# Patient Record
Sex: MALE | Race: WHITE
[De-identification: names, ages, dates, MRNs, and addresses within clinical notes are randomized per-mention and may not be internally consistent; named-entity substitution may affect disease eponyms.]

---

## 2017-01-24 ENCOUNTER — HOSPITAL ENCOUNTER (OUTPATIENT)
Dept: HOSPITAL 45 - C.EDB | Age: 57
Setting detail: OBSERVATION
LOS: 1 days | Discharge: HOME | End: 2017-01-25
Attending: SURGERY | Admitting: SURGERY
Payer: COMMERCIAL

## 2017-01-24 VITALS
HEIGHT: 66 IN | WEIGHT: 216.27 LBS | BODY MASS INDEX: 34.76 KG/M2 | BODY MASS INDEX: 34.76 KG/M2 | HEIGHT: 66 IN | WEIGHT: 216.27 LBS

## 2017-01-24 DIAGNOSIS — K35.80: Primary | ICD-10-CM

## 2017-01-24 DIAGNOSIS — Z82.49: ICD-10-CM

## 2017-01-24 DIAGNOSIS — Z98.84: ICD-10-CM

## 2017-01-24 DIAGNOSIS — Z79.899: ICD-10-CM

## 2017-01-24 DIAGNOSIS — I10: ICD-10-CM

## 2017-01-24 LAB
ALP SERPL-CCNC: 71 U/L (ref 45–117)
ALT SERPL-CCNC: 18 U/L (ref 12–78)
ANION GAP SERPL CALC-SCNC: 13 MMOL/L (ref 3–11)
ANION GAP SERPL CALC-SCNC: 18 MMOL/L (ref 16–25)
AST SERPL-CCNC: 14 U/L (ref 15–37)
BASOPHILS # BLD: 0.02 K/UL (ref 0–0.2)
BASOPHILS NFR BLD: 0.2 %
BUN SERPL-MCNC: 14 MG/DL (ref 7–18)
BUN/CREAT SERPL: 17.7 (ref 10–20)
CA-I BLD-SCNC: 1.15 MMOL/L (ref 1.12–1.32)
CALCIUM SERPL-MCNC: 9.2 MG/DL (ref 8.5–10.1)
CHLORIDE BLD-SCNC: 106 MEQ/L (ref 101–112)
CHLORIDE SERPL-SCNC: 107 MMOL/L (ref 98–107)
CO2 SERPL-SCNC: 23 MMOL/L (ref 21–32)
COMPLETE: YES
CREAT BLD-MCNC: 0.7 MG/DL (ref 0.6–1.3)
CREAT CL PREDICTED SERPL C-G-VRATE: 111.7 ML/MIN
CREAT SERPL-MCNC: 0.79 MG/DL (ref 0.6–1.4)
EOSINOPHIL NFR BLD AUTO: 160 K/UL (ref 130–400)
GLUCOSE SERPL-MCNC: 94 MG/DL (ref 70–99)
HCT VFR BLD AUTO: 45 % (ref 42–52)
HCT VFR BLD CALC: 42.5 % (ref 42–52)
HGB BLD-MCNC: 15.3 G/DL (ref 14–18)
IG%: 0.3 %
IMM GRANULOCYTES NFR BLD AUTO: 13 %
ISTAT CARBON DIOXIDE: 23 MEQ/L (ref 24–31)
LYMPHOCYTES # BLD: 1.36 K/UL (ref 1.2–3.4)
MCH RBC QN AUTO: 28.2 PG (ref 25–34)
MCHC RBC AUTO-ENTMCNC: 33.9 G/DL (ref 32–36)
MCV RBC AUTO: 83.3 FL (ref 80–100)
MONOCYTES NFR BLD: 10.4 %
NEUTROPHILS # BLD AUTO: 0.3 %
NEUTROPHILS NFR BLD AUTO: 75.8 %
PMV BLD AUTO: 11.3 FL (ref 7.4–10.4)
POTASSIUM SERPL-SCNC: 3.8 MMOL/L (ref 3.5–5.1)
RBC # BLD AUTO: 5.1 M/UL (ref 4.7–6.1)
SODIUM BLD-SCNC: 142 MEQ/L (ref 135–144)
SODIUM SERPL-SCNC: 143 MMOL/L (ref 136–145)
WBC # BLD AUTO: 10.44 K/UL (ref 4.8–10.8)

## 2017-01-24 NOTE — DISCHARGE INSTRUCTIONS
Discharge Instructions


Admission


Reason for Admission:  Pain In Rlq





Discharge


Discharge Diagnosis / Problem:  acute appendicitis





Discharge Goals


Goal(s):  Decrease discomfort, Improve function





Activity Recommendations


Activity Limitations:  as noted below


Lifting Limitations:  no more than 10 pounds


Exercise/Sports Limitations:  until after follow-up appointment


May Resume Sexual Activity:  after follow-up appointment


Shower/Bathe:  tomorrow





.





Instructions / Follow-Up


Instructions / Follow-Up


call [phone] for follow up appt with Dr. Romo





Current Hospital Diet


Patient's current hospital diet:





Discharge Diet


Recommended Diet:  Regular Diet





Procedures


Procedures Performed:  


lap appy





Pending Studies


Studies pending at discharge:  yes (pathology report)


List of pending studies:  


path report





Medical Emergencies








.


Who to Call and When:





Medical Emergencies:  If at any time you feel your situation is an emergency, 

please call 911 immediately.





.





Non-Emergent Contact


Non-Emergency issues call your:  Primary Care Provider, Surgeon


Call Non-Emergent contact if:  temperature is above 101, your pain is not 

controlled, wound has increased drainage, wound has increased redness


.





"Provider Documentation" section prepared by Cornelio Romo.





VTE Core Measure


Inpt VTE Proph given/why not?:  Enoxaparin (Lovenox)SQ, SCD's

## 2017-01-24 NOTE — EMERGENCY ROOM VISIT NOTE
History


Report prepared by Abelino:  Cornelio Roblero


Under the Supervision of:  Dr. Jerson Payton M.D.


First contact with patient:  19:38


Chief Complaint:  ABDOMINAL PAIN


Stated Complaint:  PAIN IN RLQ





History of Present Illness


The patient is a 56 year old male who presents to the Emergency Room with 

complaints of persistent right lower quadrant abdominal pain that started mid-

day yesterday. He says that he had a bowel movement at a truck stop, and then 

started driving again. A bit later, he started having the abdominal pain. The 

patient felt nauseated last night and from time to time, he has been feeling 

dizzy. He notes that breathing does exacerbate the pain. The patient denies any 

vomiting, diarrhea, fevers, shortness of breath, chest pain, urinary symptoms, 

or hematochezia. The patient did take Tylenol yesterday but it did not help. He 

has a history of a gastric bypass. The patient was here in August for a low 

iron count and was admitted.





   Source of History:  patient


   Onset:  Mid-day yesterday


   Position:  abdomen (right-sided)


   Timing:  other (persistent)


   Modifying Factors (Worsening):  breathing


   Associated Symptoms:  + nausea, No SOB, No chest pain, No diarrhea, No fevers

, No hematochezia, No urinary symptoms, No vomiting


Note:


Associated symptoms: Intermittent dizziness.





Review of Systems


See HPI for pertinent positives & negatives. A total of 10 systems reviewed and 

were otherwise negative.





Past Medical & Surgical


Medical Problems:


(1) Abdominal pain


(2) Abdominal pain


(3) Appendicitis


(4) Dehydration


(5) HTN (hypertension)


(6) Perforated ulcer


Surgical Problems:


(1) H/O gastric bypass


(2) S/P laparoscopic surgery








Family History





Heart disease


Hypertension





Social History


Smoking Status:  Never Smoker


Alcohol Use:  occasionally


Marital Status:  


Occupation Status:  employed





Current/Historical Medications


Scheduled


Atenolol (Atenolol), 12.5 MG PO DAILY


Cholecalciferol (Vitamin D), 4,000 INTER.UNIT PO WK


Ferrous Gluconate (Ferrous Gluconate), 324 MG PO DAILY


Multivitamin (Multivitamin), 1 TAB PO DAILY


Pantoprazole (Pantoprazole Sodium), 40 MG PO DAILY





Scheduled PRN


Hydrocodone/Acetaminophen 5MG/325MG (Norco 5MG/325MG), 1-2 TABLET PO Q6 PRN for 

Pain





Allergies


Coded Allergies:  


     No Known Allergies (Unverified , 1/11/15)





Physical Exam


Vital Signs











  Date Time  Temp Pulse Resp B/P Pulse Ox O2 Delivery O2 Flow Rate FiO2


 


1/24/17 20:56  88 18 148/76 96 Room Air  


 


1/24/17 19:37 37.1 92 18 153/82 95 Room Air  











Physical Exam


GENERAL: Patient is uncomfortable-appearing and in mild distress.


HEENT: No acute trauma, normocephalic atraumatic, mucous membranes moist, no 

nasal congestion, no scleral icterus.


NECK: No stridor, no adenopathy, no meningismus, trachea is midline.


LUNGS: No dyspnea. Clear to auscultation and equal bilaterally. No wheeze, no 

rhonchi.


HEART: Regular rate and rhythm.  No murmurs, rubs, gallops appreciated.


ABDOMEN: Soft, right lower quadrant tenderness to palpation, bowel sounds 

positive, no masses appreciated, no peritonitis.


BACK: No midline tenderness, no CVA tenderness


EXTREMITIES: Normal motion all extremities, no cyanosis, no edema.


NEUROLOGIC: Alert and oriented, no acute motor or sensory deficits, no focal 

weakness, cranial nerves grossly intact.


SKIN: No rash, no jaundice, no diaphoresis.





Medical Decision & Procedures


ER Provider


Diagnostic Interpretation:


CT results are stated below per my interpretation and the radiologist's 

interpretation.  








CT OF THE ABDOMEN AND PELVIS WITH CONTRAST





CLINICAL HISTORY: Right lower quadrant pain.    





COMPARISON STUDY:  CT of the abdomen and pelvis January 11, 2015 and renal


ultrasound May 11, 2015.





TECHNIQUE: Following IV administration of 92 mL of Optiray-320, axial images of


the abdomen and pelvis were obtained from the lung bases to the proximal femurs.


Images were reviewed in the axial, sagittal, and coronal planes. IV contrast was


administered without complication.





CT DOSE: 628.56 mGy.cm





FINDINGS: A small hiatal hernia is present. Note is made of findings consistent


with gastric bypass. There is no evidence for a bowel obstruction. The


gallbladder us surgically absent. The liver, spleen, adrenal glands and left


kidney are normal. 2 right renal cysts are again noted. There is no free air,


pneumatosis or portal venous gas. The appendix is mildly dilated, measuring 1 cm


in caliber. There is mild periappendiceal infiltration. There is no free air or


abscess. The wall of the appendix is mildly thickened. No lymphadenopathy is


present. There is extensive sigmoid diverticulosis without evidence for acute


diverticulitis. The bladder is underdistended. Bladder wall thickening is again


noted. There is a small fat-containing left inguinal hernia. Mild dilatation of


the proximal celiac axis is unchanged. No suspicious skeletal lesions are


identified.











IMPRESSION:  





1. Findings consistent with acute appendicitis. No free air or abscess.





2. Nonspecific bladder wall thickening which is accentuated by underdistention.





Electronically signed by:  Joseph Rubin M.D.


1/24/2017 8:36 PM





Dictated Date/Time:  1/24/2017 8:30 PM





Laboratory Results


1/24/17 20:00








Red Blood Count 5.10, Mean Corpuscular Volume 83.3, Mean Corpuscular Hemoglobin 

28.2, Mean Corpuscular Hemoglobin Concent 33.9, Mean Platelet Volume 11.3, 

Neutrophils (%) (Auto) 75.8, Lymphocytes (%) (Auto) 13.0, Monocytes (%) (Auto) 

10.4, Eosinophils (%) (Auto) 0.3, Basophils (%) (Auto) 0.2, Neutrophils # (Auto

) 7.91, Lymphocytes # (Auto) 1.36, Monocytes # (Auto) 1.09, Eosinophils # (Auto

) 0.03, Basophils # (Auto) 0.02





1/24/17 20:00

















Test


  1/24/17


20:00 1/24/17


20:12


 


White Blood Count


  10.44 K/uL


(4.8-10.8) 


 


 


Red Blood Count


  5.10 M/uL


(4.7-6.1) 


 


 


Hemoglobin


  14.4 g/dL


(14.0-18.0) 


 


 


Hematocrit 42.5 % (42-52)  


 


Mean Corpuscular Volume


  83.3 fL


() 


 


 


Mean Corpuscular Hemoglobin


  28.2 pg


(25-34) 


 


 


Mean Corpuscular Hemoglobin


Concent 33.9 g/dl


(32-36) 


 


 


Platelet Count


  160 K/uL


(130-400) 


 


 


Mean Platelet Volume


  11.3 fL


(7.4-10.4) 


 


 


Neutrophils (%) (Auto) 75.8 %  


 


Lymphocytes (%) (Auto) 13.0 %  


 


Monocytes (%) (Auto) 10.4 %  


 


Eosinophils (%) (Auto) 0.3 %  


 


Basophils (%) (Auto) 0.2 %  


 


Neutrophils # (Auto)


  7.91 K/uL


(1.4-6.5) 


 


 


Lymphocytes # (Auto)


  1.36 K/uL


(1.2-3.4) 


 


 


Monocytes # (Auto)


  1.09 K/uL


(0.11-0.59) 


 


 


Eosinophils # (Auto)


  0.03 K/uL


(0-0.5) 


 


 


Basophils # (Auto)


  0.02 K/uL


(0-0.2) 


 


 


RDW Standard Deviation


  50.5 fL


(36.4-46.3) 


 


 


RDW Coefficient of Variation


  16.6 %


(11.5-14.5) 


 


 


Immature Granulocyte % (Auto) 0.3 %  


 


Immature Granulocyte # (Auto)


  0.03 K/uL


(0.00-0.02) 


 


 


Est Creatinine Clear Calc


Drug Dose 111.7 ml/min 


  


 


 


Estimated GFR (


American) 114.7 


  


 


 


Estimated GFR (Non-


American 99.0 


  


 


 


BUN/Creatinine Ratio 17.7 (10-20)  


 


Calcium Level


  9.2 mg/dl


(8.5-10.1) 


 


 


Total Bilirubin


  0.6 mg/dl


(0.2-1) 


 


 


Direct Bilirubin


  0.2 mg/dl


(0-0.2) 


 


 


Aspartate Amino Transf


(AST/SGOT) 14 U/L (15-37) 


  


 


 


Alanine Aminotransferase


(ALT/SGPT) 18 U/L (12-78) 


  


 


 


Alkaline Phosphatase


  71 U/L


() 


 


 


Total Protein


  7.2 gm/dl


(6.4-8.2) 


 


 


Albumin


  3.9 gm/dl


(3.4-5.0) 


 


 


Lipase


  107 U/L


() 


 


 


Bedside Hemoglobin


  


  15.3 g/dl


(14.0-18.0)


 


Bedside Hematocrit  45 % (42-52) 


 


Bedside Sodium


  


  142 mEq/L


(135-144)


 


Bedside Potassium


  


  3.8 mEq/L


(3.3-5.0)


 


Bedside Chloride


  


  106 mEq/L


(101-112)


 


Bedside Total CO2


  


  23 mEq/l


(24-31)


 


Anion Gap


  


  18.0 mmol/L


(16-25)


 


Bedside Blood Urea Nitrogen


  


  14 mg/dl


(7-18)


 


Bedside Creatinine


  


  0.7 mg/dl


(0.6-1.3)


 


Bedside Glucose (other)


  


  97 mg/dl


(70-99)


 


Bedside Ionized Calcium (Martínez)


  


  1.15 mmol/l


(1.12-1.32)





Laboratory results as reviewed by me.





Medications Administered











 Medications


  (Trade)  Dose


 Ordered  Sig/Nisa


 Route  Start Time


 Stop Time Status Last Admin


Dose Admin


 


 Sodium Chloride


  (Nss 1000ml)  1,000 ml @ 


 999 mls/hr  Q1H1M STAT


 IV  1/24/17 19:50


 1/24/17 20:50 DC 1/24/17 20:54


999 MLS/HR


 


 Cefoxitin Sodium


  (Mefoxin IV)  2,000 mg  NOW  STAT


 IV  1/24/17 20:43


 1/24/17 20:44 DC 1/24/17 20:55


2,000 MG


 


 Hydromorphone HCl


  (Dilaudid Inj)  1 mg  NOW  STAT


 IV  1/24/17 20:49


 1/24/17 20:51 DC 1/24/17 20:54


1 MG


 


 Ondansetron HCl


  (Zofran Inj)  4 mg  NOW  STAT


 IV  1/24/17 20:49


 1/24/17 20:51 DC 1/24/17 20:54


4 MG











ED Course


1940: The patient was evaluated in room C4. A complete history and physical 

exam was performed.





1950: Ordered NSS 1000 ml @ 999 mls/hr IV.





2043: Ordered Mefoxin IV 2000 mg IV.





2049: Ordered Zofran Inj 4 mg IV, Dilaudid Inj 1 mg IV.





2045: I discussed the patient with Dr. Demetrius WILKINSON General Surgery - he 

will evaluate the patient for further treatment.





2046: I reevaluated the patient and he would like something for pain.





Medical Decision


Differential:  Appendicitis, , MSK, Diverticulitis, UTI, Renal Colic, Bowel 

Obstruction, Aortic Pathology, amongst other pathologies entertained.





56 yr old male with RLQ TTP and pain over last 24-48 hours.  Labs look good but 

with exam felt CT required which revealed acute appendicitis. He is stable, not 

septic and doing well with some Dilaudid.  Given Mefoxin and gen surg in to see 

pt and took him to OR.





Consults


Time Called:  2043


Consulting Physician:  Dr. Demetrius WILKINSON General Surgery


Returned Call:  2045


 I discussed the patient with Dr. Demetrius WILKINSON General Surgery - he will 

evaluate the patient for further treatment.





Impression





 Primary Impression:  


 Acute appendicitis





Scribe Attestation


The scribe's documentation has been prepared under my direction and personally 

reviewed by me in its entirety. I confirm that the note above accurately 

reflects all work, treatment, procedures, and medical decision making performed 

by me.





Departure Information


Dispostion


Being Evaluated By Hospitalist





Prescriptions





Hydrocodone/Acetaminophen 5MG/325MG (Norco 5MG/325MG)  Tab


1-2 TABLET PO Q6 Y for Pain, #30 TAB


   Prov: Cornelio Romo D.O.         1/24/17





Referrals


Eric Andrade PA-C (PCP)





Patient Instructions


My [facility]





Problem Qualifiers








 Primary Impression:  


 Acute appendicitis


 Acute appendicitis type:  with localized peritonitis  Qualified Codes:  K35.3 

- Acute appendicitis with localized peritonitis

## 2017-01-24 NOTE — HISTORY AND PHYSICAL
History & Physical


Date


Jan 24, 2017.





Chief Complaint


abdominal pain





History of Present Illness


The patient is a 56 year old male with complaints of mid abdominal pain 

starting yesterday...worsening and pain now more RLQ.





Past Medical/Surgical History


Medical Problems:


(1) Abdominal pain


(2) Abdominal pain


(3) Appendicitis


(4) Dehydration


(5) HTN (hypertension)


(6) Perforated ulcer


Surgical Problems:


(1) H/O gastric bypass


(2) S/P laparoscopic surgery








Additional History


Hepatic Disease:  No


Endocrine Disorder:  No


Kidney Disease:  No


Hypertension:  Yes


Heart Disease:  No


Bleeding Tendencies:  No


Infectious Diseases:  No





Allergies


Coded Allergies:  


     No Known Allergies (Unverified , 1/11/15)





Home Medications


Scheduled


Atenolol (Atenolol), 12.5 MG PO DAILY


Cholecalciferol (Vitamin D), 4,000 INTER.UNIT PO WK


Ferrous Gluconate (Ferrous Gluconate), 324 MG PO DAILY


Multivitamin (Multivitamin), 1 TAB PO DAILY


Pantoprazole (Pantoprazole Sodium), 40 MG PO DAILY





Physical Examination


Skin:  warm/dry


Eyes:  normal inspection, EOMI


Head:  normocephalic


Neck:  supple


Respiratory/Chest:  lungs clear


Cardiovascular:  regular rate, rhythm, no edema


Abdomen / GI:  + pertinent finding (diffuse ttp.  tender RLQ.  mild guarding.)


Extremities:  normal inspection





Diagnosis


acute appendicitis





Plan of Treatment


discussed options/risks of surgery


bleeding/infection/abcess/dvt/pe/mi/injury to another organ/leakage etc...


questions answered


lap/possible open appendectomy tonight.

## 2017-01-24 NOTE — DIAGNOSTIC IMAGING REPORT
CT OF THE ABDOMEN AND PELVIS WITH CONTRAST



CLINICAL HISTORY: Right lower quadrant pain.    



COMPARISON STUDY:  CT of the abdomen and pelvis January 11, 2015 and renal

ultrasound May 11, 2015.



TECHNIQUE: Following IV administration of 92 mL of Optiray-320, axial images of

the abdomen and pelvis were obtained from the lung bases to the proximal femurs.

Images were reviewed in the axial, sagittal, and coronal planes. IV contrast was

administered without complication.



CT DOSE: 628.56 mGy.cm



FINDINGS: A small hiatal hernia is present. Note is made of findings consistent

with gastric bypass. There is no evidence for a bowel obstruction. The

gallbladder us surgically absent. The liver, spleen, adrenal glands and left

kidney are normal. 2 right renal cysts are again noted. There is no free air,

pneumatosis or portal venous gas. The appendix is mildly dilated, measuring 1 cm

in caliber. There is mild periappendiceal infiltration. There is no free air or

abscess. The wall of the appendix is mildly thickened. No lymphadenopathy is

present. There is extensive sigmoid diverticulosis without evidence for acute

diverticulitis. The bladder is underdistended. Bladder wall thickening is again

noted. There is a small fat-containing left inguinal hernia. Mild dilatation of

the proximal celiac axis is unchanged. No suspicious skeletal lesions are

identified.







IMPRESSION:  



1. Findings consistent with acute appendicitis. No free air or abscess.



2. Nonspecific bladder wall thickening which is accentuated by underdistention.







Electronically signed by:  Joseph Rubin M.D.

1/24/2017 8:36 PM



Dictated Date/Time:  1/24/2017 8:30 PM

## 2017-01-24 NOTE — [facility] OPERATIVE REPORT
Operative Report


Operative Date


Jan 24, 2017.





Pre-Operative Diagnosis





acute appendicitis





Post-Operative Diagnosis


acute appendicitis





Procedure(s) Performed


lap appy





Surgeon


leo





Findings


acutely inflammed appendix





Anesthesia


get





Complication(s)


None





Disposition


Recovery Room / PACU


I attest to the content of the Intraoperative Record and any orders documented 

therein.  Any exceptions are noted below.

## 2017-01-25 VITALS
DIASTOLIC BLOOD PRESSURE: 78 MMHG | SYSTOLIC BLOOD PRESSURE: 122 MMHG | OXYGEN SATURATION: 94 % | HEART RATE: 88 BPM | TEMPERATURE: 98.6 F

## 2017-01-25 VITALS
HEART RATE: 102 BPM | DIASTOLIC BLOOD PRESSURE: 90 MMHG | SYSTOLIC BLOOD PRESSURE: 146 MMHG | OXYGEN SATURATION: 95 % | TEMPERATURE: 99.86 F

## 2017-01-25 VITALS
SYSTOLIC BLOOD PRESSURE: 129 MMHG | OXYGEN SATURATION: 93 % | DIASTOLIC BLOOD PRESSURE: 71 MMHG | TEMPERATURE: 98.06 F | HEART RATE: 99 BPM

## 2017-01-25 VITALS
DIASTOLIC BLOOD PRESSURE: 90 MMHG | SYSTOLIC BLOOD PRESSURE: 146 MMHG | OXYGEN SATURATION: 94 % | HEART RATE: 98 BPM | TEMPERATURE: 99.14 F

## 2017-01-25 VITALS
TEMPERATURE: 98.6 F | DIASTOLIC BLOOD PRESSURE: 78 MMHG | HEART RATE: 88 BPM | OXYGEN SATURATION: 94 % | SYSTOLIC BLOOD PRESSURE: 122 MMHG

## 2017-01-25 VITALS
SYSTOLIC BLOOD PRESSURE: 148 MMHG | DIASTOLIC BLOOD PRESSURE: 80 MMHG | TEMPERATURE: 98.42 F | OXYGEN SATURATION: 94 % | HEART RATE: 94 BPM

## 2017-01-25 VITALS — OXYGEN SATURATION: 92 %

## 2017-01-25 VITALS
HEART RATE: 107 BPM | OXYGEN SATURATION: 94 % | TEMPERATURE: 99.86 F | DIASTOLIC BLOOD PRESSURE: 80 MMHG | SYSTOLIC BLOOD PRESSURE: 138 MMHG

## 2017-01-25 VITALS — OXYGEN SATURATION: 93 %

## 2017-01-25 VITALS
SYSTOLIC BLOOD PRESSURE: 144 MMHG | TEMPERATURE: 99.5 F | HEART RATE: 98 BPM | DIASTOLIC BLOOD PRESSURE: 80 MMHG | OXYGEN SATURATION: 94 %

## 2017-01-25 VITALS
HEART RATE: 99 BPM | DIASTOLIC BLOOD PRESSURE: 71 MMHG | OXYGEN SATURATION: 93 % | SYSTOLIC BLOOD PRESSURE: 129 MMHG | TEMPERATURE: 98.06 F

## 2017-01-25 LAB
ALBUMIN/GLOB SERPL: 1.2 {RATIO} (ref 0.9–2)
ALP SERPL-CCNC: 160 U/L (ref 45–117)
ALT SERPL-CCNC: 226 U/L (ref 12–78)
ANION GAP SERPL CALC-SCNC: 12 MMOL/L (ref 3–11)
AST SERPL-CCNC: 300 U/L (ref 15–37)
BASOPHILS # BLD: 0.01 K/UL (ref 0–0.2)
BASOPHILS NFR BLD: 0.1 %
BUN SERPL-MCNC: 11 MG/DL (ref 7–18)
BUN/CREAT SERPL: 15.7 (ref 10–20)
CALCIUM SERPL-MCNC: 8.7 MG/DL (ref 8.5–10.1)
CHLORIDE SERPL-SCNC: 106 MMOL/L (ref 98–107)
CO2 SERPL-SCNC: 23 MMOL/L (ref 21–32)
COMPLETE: YES
CREAT CL PREDICTED SERPL C-G-VRATE: 127.9 ML/MIN
CREAT SERPL-MCNC: 0.69 MG/DL (ref 0.6–1.4)
EOSINOPHIL NFR BLD AUTO: 145 K/UL (ref 130–400)
GLOBULIN SER-MCNC: 2.9 GM/DL (ref 2.5–4)
GLUCOSE SERPL-MCNC: 121 MG/DL (ref 70–99)
HCT VFR BLD CALC: 39.2 % (ref 42–52)
IG%: 0.3 %
IMM GRANULOCYTES NFR BLD AUTO: 3.8 %
INR PPP: 1.1 (ref 0.9–1.1)
LYMPHOCYTES # BLD: 0.45 K/UL (ref 1.2–3.4)
MCH RBC QN AUTO: 28.2 PG (ref 25–34)
MCHC RBC AUTO-ENTMCNC: 33.4 G/DL (ref 32–36)
MCV RBC AUTO: 84.5 FL (ref 80–100)
MONOCYTES NFR BLD: 9 %
NEUTROPHILS # BLD AUTO: 0 %
NEUTROPHILS NFR BLD AUTO: 86.8 %
PARTIAL THROMBOPLASTIN RATIO: 1.1
PMV BLD AUTO: 11 FL (ref 7.4–10.4)
POTASSIUM SERPL-SCNC: 3.9 MMOL/L (ref 3.5–5.1)
PROTHROMBIN TIME: 12.3 SECONDS (ref 9–12)
RBC # BLD AUTO: 4.64 M/UL (ref 4.7–6.1)
SODIUM SERPL-SCNC: 141 MMOL/L (ref 136–145)
WBC # BLD AUTO: 11.93 K/UL (ref 4.8–10.8)

## 2017-01-25 RX ADMIN — SODIUM CHLORIDE, SODIUM LACTATE, POTASSIUM CHLORIDE, AND CALCIUM CHLORIDE SCH MLS/HR: 600; 310; 30; 20 INJECTION, SOLUTION INTRAVENOUS at 04:35

## 2017-01-25 RX ADMIN — SODIUM CHLORIDE, SODIUM LACTATE, POTASSIUM CHLORIDE, AND CALCIUM CHLORIDE SCH MLS/HR: 600; 310; 30; 20 INJECTION, SOLUTION INTRAVENOUS at 01:42

## 2017-01-25 RX ADMIN — SODIUM CHLORIDE, SODIUM LACTATE, POTASSIUM CHLORIDE, AND CALCIUM CHLORIDE SCH MLS/HR: 600; 310; 30; 20 INJECTION, SOLUTION INTRAVENOUS at 12:59

## 2017-01-25 RX ADMIN — MORPHINE SULFATE PRN MG: 4 INJECTION, SOLUTION INTRAMUSCULAR; INTRAVENOUS at 07:43

## 2017-01-25 RX ADMIN — MORPHINE SULFATE PRN MG: 4 INJECTION, SOLUTION INTRAMUSCULAR; INTRAVENOUS at 03:16

## 2017-01-25 NOTE — ANESTHESIOLOGY PROGRESS NOTE
Anesthesia Post Op Note


Date & Time


Jan 25, 2017 at 00:36





Vital Signs


Pain Intensity:  0





 Vital Signs Past 12 Hours








  Date Time  Temp Pulse Resp B/P Pulse Ox O2 Delivery O2 Flow Rate FiO2


 


1/25/17 00:20  93 24 152/86 95 Mask 5 


 


1/25/17 00:15  98 19 164/86 94 Mask 5 


 


1/25/17 00:10  94 23 168/90 94 Mask 5 


 


1/25/17 00:05  94 18 164/88 95 Mask 5 


 


1/25/17 00:00  94 22 171/93 95  5 


 


1/24/17 23:55  104 20 189/101 94 Mask 5 


 


1/24/17 23:50  102 30 165/122 86 Mask 5 


 


1/24/17 23:45 37.4 103 32 181/98 90 Mask 5 


 


1/24/17 20:56  88 18 148/76 96 Room Air  


 


1/24/17 19:37 37.1 92 18 153/82 95 Room Air  











Notes


Mental Status:  alert / awake / arousable, participated in evaluation


Pt Amnestic to Procedure:  Yes


Nausea / Vomiting:  adequately controlled


Pain:  adequately controlled


Airway Patency, RR, SpO2:  stable & adequate


BP & HR:  stable & adequate


Hydration State:  stable & adequate


Anesthetic Complications:  no major complications apparent

## 2017-01-25 NOTE — OPERATIVE REPORT
DATE OF OPERATION:  01/24/2017

 

PREOPERATIVE DIAGNOSIS:  Acute appendicitis.

 

POSTOPERATIVE DIAGNOSIS:  Same.

 

PROCEDURE:  Laparoscopic appendectomy.

 

SURGEON:  Dr. Romo.

 

ESTIMATED BLOOD LOSS:  10 mL

 

COMPLICATIONS:  No immediate.

 

ANESTHESIA:  General.  The patient tolerated the procedure well.

 

OPERATIVE NOTE:  After informed consent was obtained, the patient was taken

to the operating suite and placed in the supine position.  After successful

intubation, the left arm was tucked and the lower abdomen was shaved and

sterilely prepped and draped in the usual fashion.  A periumbilical incision

was made with an 11 blade scalpel and carried down through the soft tissue

using electrocautery.  The anterior rectus fascia was opened using

electrocautery and two #0 Vicryl stay sutures were placed.  Peritoneum was

entered using blunt finger penetration and a finger sweep was performed to

take down adhesions.  A 12-mm Alyssa trocar was placed and the abdomen was

insufflated to 18 mmHg.  Laparoscope was inserted and the abdomen examined

360 degrees.  A left lower quadrant 12-mm port and a suprapubic 5-mm port

were all placed under direct vision.  The patient was airplaned to the left

and placed in a slight Trendelenburg.  We went to the right lower quadrant,

immediately had an ischemic acutely inflamed appendix.  The cecum looked

normal at the terminal ileum.  I did run the terminal ileum backwards for

several feet and did not see any other abnormality.  There was no free fluid

in the pelvis and I ruptured the appendix.  I was able to grasp the appendix

at its mid body and elevate it from the side wall.  A ANTOINE purple cartridge

linear stapler was used to transect the mesoappendix as well as the appendix

itself at its base with the cecum.  After doing this, it was placed into an

EndoCatch bag.  We did look around the rest of the abdomen.  The patient had

a history of prior gastric bypass, I was unable to clearly see his upper

abdominal anatomy due to his size.  Liver looked normal as did the small and

large bowels.  I saw no evidence of inguinal or ventral hernias.  This

completed the procedure.  There was adequate hemostasis.  The trocars were

removed as was the appendix.  The abdomen was desufflated.  The fascia of the

camera port was closed using 0 Vicryl in a figure-of-eight fashion.  All the

wounds were irrigated and closed using 4-0 Monocryl.  Marcaine was injected

around them for postoperative analgesia and skin glue used as dressing.  The

patient was awakened, extubated, and transferred to recovery in stable

condition.

 

 

I attest to the content of the Intraoperative Record and any orders documented therein. Any exceptio
ns are noted below.

## 2017-01-31 NOTE — DISCHARGE SUMMARY
DISCHARGE DIAGNOSIS:  Acute appendicitis.  

 

SUMMARY:  This 56-year-old white male who presented to the emergency room

with abdominal pain progressing to his right lower quadrant.  Workup revealed

acute appendicitis at which point I was consulted.  He was taken to the

operating room in the early morning hours of 01/25/2017 and underwent a

laparoscopic appendectomy.  The procedure went without incident.  He was

admitted to the surgical floor for postoperative observation and symptom

control.   By the following morning he was feeling much better.  His pain was

improved.  He was tolerating liquids and his pain was controlled.  He was

deemed stable for discharge at that time.  He was discharged home on

01/25/2017 with written and oral discharge instructions.  He was to followup

with myself in the office within a 1-2 week period.

## 2017-02-01 ENCOUNTER — HOSPITAL ENCOUNTER (INPATIENT)
Dept: HOSPITAL 45 - C.EDB | Age: 57
LOS: 5 days | Discharge: HOME | DRG: 909 | End: 2017-02-06
Attending: SURGERY | Admitting: SURGERY
Payer: COMMERCIAL

## 2017-02-01 ENCOUNTER — HOSPITAL ENCOUNTER (OUTPATIENT)
Dept: HOSPITAL 45 - C.LAB | Age: 57
Discharge: HOME | End: 2017-02-01
Attending: SURGERY
Payer: COMMERCIAL

## 2017-02-01 VITALS
SYSTOLIC BLOOD PRESSURE: 131 MMHG | HEART RATE: 95 BPM | DIASTOLIC BLOOD PRESSURE: 77 MMHG | OXYGEN SATURATION: 93 % | TEMPERATURE: 98.42 F

## 2017-02-01 VITALS
HEART RATE: 100 BPM | DIASTOLIC BLOOD PRESSURE: 85 MMHG | OXYGEN SATURATION: 92 % | SYSTOLIC BLOOD PRESSURE: 141 MMHG | TEMPERATURE: 99.5 F

## 2017-02-01 VITALS
BODY MASS INDEX: 31.14 KG/M2 | HEIGHT: 68 IN | WEIGHT: 205.47 LBS | BODY MASS INDEX: 31.14 KG/M2 | HEIGHT: 68 IN | WEIGHT: 205.47 LBS

## 2017-02-01 DIAGNOSIS — I10: ICD-10-CM

## 2017-02-01 DIAGNOSIS — K91.870: Primary | ICD-10-CM

## 2017-02-01 DIAGNOSIS — K21.9: ICD-10-CM

## 2017-02-01 DIAGNOSIS — Z98.84: ICD-10-CM
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
## 2017-02-01 DIAGNOSIS — Z82.49: ICD-10-CM

## 2017-02-01 DIAGNOSIS — K35.80: Primary | ICD-10-CM

## 2017-02-01 LAB
ALBUMIN/GLOB SERPL: 0.7 {RATIO} (ref 0.9–2)
ALP SERPL-CCNC: 109 U/L (ref 45–117)
ALT SERPL-CCNC: 37 U/L (ref 12–78)
ANION GAP SERPL CALC-SCNC: 7 MMOL/L (ref 3–11)
APPEARANCE UR: CLEAR
AST SERPL-CCNC: 18 U/L (ref 15–37)
BASOPHILS # BLD: 0.05 K/UL (ref 0–0.2)
BASOPHILS NFR BLD: 0.3 %
BILIRUB UR-MCNC: (no result) MG/DL
BUN SERPL-MCNC: 23 MG/DL (ref 7–18)
BUN/CREAT SERPL: 32.4 (ref 10–20)
CALCIUM SERPL-MCNC: 9.2 MG/DL (ref 8.5–10.1)
CHLORIDE SERPL-SCNC: 110 MMOL/L (ref 98–107)
CO2 SERPL-SCNC: 26 MMOL/L (ref 21–32)
COLOR UR: (no result)
COMPLETE: YES
CREAT CL PREDICTED SERPL C-G-VRATE: 123.9 ML/MIN
CREAT SERPL-MCNC: 0.72 MG/DL (ref 0.6–1.4)
EOSINOPHIL NFR BLD AUTO: 318 K/UL (ref 130–400)
EOSINOPHIL NFR BLD AUTO: 320 K/UL (ref 130–400)
GLOBULIN SER-MCNC: 4.5 GM/DL (ref 2.5–4)
GLUCOSE SERPL-MCNC: 141 MG/DL (ref 70–99)
HCT VFR BLD CALC: 41.1 % (ref 42–52)
HCT VFR BLD CALC: 42.6 % (ref 42–52)
IG%: 1 %
IMM GRANULOCYTES NFR BLD AUTO: 8.3 %
INR PPP: 1.1 (ref 0.9–1.1)
LYMPHOCYTES # BLD: 1.33 K/UL (ref 1.2–3.4)
MANUAL MICROSCOPIC REQUIRED?: NO
MCH RBC QN AUTO: 27.3 PG (ref 25–34)
MCH RBC QN AUTO: 28.2 PG (ref 25–34)
MCHC RBC AUTO-ENTMCNC: 32.6 G/DL (ref 32–36)
MCHC RBC AUTO-ENTMCNC: 33.1 G/DL (ref 32–36)
MCV RBC AUTO: 83.9 FL (ref 80–100)
MCV RBC AUTO: 85.2 FL (ref 80–100)
MONOCYTES NFR BLD: 9.3 %
NEUTROPHILS # BLD AUTO: 0.8 %
NEUTROPHILS NFR BLD AUTO: 80.3 %
NITRITE UR QL STRIP: (no result)
PARTIAL THROMBOPLASTIN RATIO: 1.3
PH UR STRIP: 5.5 [PH] (ref 4.5–7.5)
PMV BLD AUTO: 10.5 FL (ref 7.4–10.4)
PMV BLD AUTO: 10.8 FL (ref 7.4–10.4)
POTASSIUM SERPL-SCNC: 3.8 MMOL/L (ref 3.5–5.1)
PROTHROMBIN TIME: 12.3 SECONDS (ref 9–12)
RBC # BLD AUTO: 4.9 M/UL (ref 4.7–6.1)
RBC # BLD AUTO: 5 M/UL (ref 4.7–6.1)
REVIEW REQ?: NO
SODIUM SERPL-SCNC: 143 MMOL/L (ref 136–145)
SP GR UR STRIP: > 1.045 (ref 1–1.03)
URINE BILL WITH OR WITHOUT MIC: (no result)
URINE EPITHELIAL CELL AUTO: >30 /LPF (ref 0–5)
UROBILINOGEN UR-MCNC: (no result) MG/DL
WBC # BLD AUTO: 15.83 K/UL (ref 4.8–10.8)
WBC # BLD AUTO: 15.99 K/UL (ref 4.8–10.8)

## 2017-02-01 RX ADMIN — PIPERACILLIN AND TAZOBACTAM SCH MLS/HR: 3; .375 INJECTION, POWDER, LYOPHILIZED, FOR SOLUTION INTRAVENOUS; PARENTERAL at 21:31

## 2017-02-01 RX ADMIN — MORPHINE SULFATE PRN MG: 2 INJECTION, SOLUTION INTRAMUSCULAR; INTRAVENOUS at 17:56

## 2017-02-01 RX ADMIN — MORPHINE SULFATE PRN MG: 2 INJECTION, SOLUTION INTRAMUSCULAR; INTRAVENOUS at 23:26

## 2017-02-01 RX ADMIN — DEXTROSE MONOHYDRATE, SODIUM CHLORIDE, AND POTASSIUM CHLORIDE SCH MLS/HR: 50; 4.5; 1.49 INJECTION, SOLUTION INTRAVENOUS at 18:24

## 2017-02-01 NOTE — HISTORY AND PHYSICAL
History & Physical


Date & Time of Service:


Feb 1, 2017 at 16:12


Chief Complaint:


Pain Lrq


Primary Care Physician:


Eric Andrade PA-C


History of Present Illness


Source:  patient


pt 1 week s/p emilia carmencita seen in office today complaining of persistent RLQ 

discomfort, low grade fever and malaise. eating but poor appetite. no emesis. 

bowels moving. obtain labs showing a wbc of 15,000....obtained a ct which shows 

abdominal abcess.





Past Medical/Surgical History


Medical Problems:


(1) Abdominal pain


Status: Resolved  





(2) Abdominal pain


Status: Resolved  





(3) Abdominal pain


Status: Resolved  





(4) Acute appendicitis


Status: Resolved  





(5) Aspiration pneumonia


Status: Resolved  





(6) Dehydration


Status: Resolved  





(7) HTN (hypertension)


Status: Chronic  





(8) Perforated ulcer


Status: Resolved  





(9) Pneumonia


Status: Resolved  





(10) Reflux Esophagitis


Status: Chronic  





(11) Sepsis


Status: Resolved  





(12) Symptomatic anemia


Status: Resolved  





Surgical Problems:


(1) H/O gastric bypass


Status: Resolved  





(2) History of laparoscopic appendectomy


Status: Resolved  





(3) S/P laparoscopic surgery


Permanent Comment: for perforated ulcer in 2011


Status: Resolved  











Family History





Heart disease


Hypertension





Social History


Smoking Status:  Never Smoker


Marital Status:  


Housing status:  lives with family


Occupational Status:  employed





Immunizations


History of Influenza Vaccine:  Yes


History of Tetanus Vaccine?:  Yes


Tetanus Immunization Date:  Dec 1, 2007


History of Pneumococcal:  No


History of Hepatitis B Vaccine:  Yes





Allergies


Coded Allergies:  


     No Known Allergies (Unverified , 1/11/15)





Home Medications


Scheduled


Atenolol (Atenolol), 12.5 MG PO DAILY


Cholecalciferol (Vitamin D), 4,000 INTER.UNIT PO WK


Ferrous Gluconate (Ferrous Gluconate), 324 MG PO DAILY


Multivitamin (Multivitamin), 1 TAB PO DAILY


Pantoprazole (Pantoprazole Sodium), 40 MG PO DAILY





Review of Systems


Constitutional:  + chills, No fatigue, No fever, No problem reported, No sweats

, No weakness, No weight loss


Abdomen:  + pain





Physical Exam


Vital Signs











  Date Time  Temp Pulse Resp B/P Pulse Ox O2 Delivery O2 Flow Rate FiO2


 


2/1/17 15:21  85 16 144/81 94 Room Air  


 


2/1/17 13:45 36.7 89 17 129/85 95 Room Air  








General Appearance:  no apparent distress


Head:  normocephalic


Eyes:  normal inspection, PERRL


Neck:  supple


Respiratory/Chest:  lungs clear


Cardiovascular:  regular rate, rhythm


Abdomen/GI:  soft, + tenderness, + pertinent finding (RLQ ttp but no peritoneal 

signs)


Neurologic/Psych:  alert, oriented x 3


Skin:  normal color, warm/dry





Diagnostics


Laboratory Results





Results Past 24 Hours








Test


  2/1/17


14:24 Range/Units


 


 


Prothrombin Time


  12.3


  9.0-12.0


SECONDS


 


Prothromb Time International


Ratio 1.1


  0.9-1.1  


 


 


Activated Partial


Thromboplast Time 33.0


  21.0-31.0


SECONDS


 


Partial Thromboplastin Ratio 1.3  


 


Sodium Level 143 136-145  mmol/L


 


Potassium Level 3.8 3.5-5.1  mmol/L


 


Chloride Level 110   mmol/L


 


Carbon Dioxide Level 26 21-32  mmol/L


 


Anion Gap 7.0 3-11  mmol/L


 


Blood Urea Nitrogen 23 7-18  mg/dl


 


Creatinine


  0.72


  0.60-1.40


mg/dl


 


Est Creatinine Clear Calc


Drug Dose 123.9


   ml/min


 


 


Estimated GFR (


American) 119.2


   


 


 


Estimated GFR (Non-


American 102.8


   


 


 


BUN/Creatinine Ratio 32.4 10-20  


 


Random Glucose 141 70-99  mg/dl


 


Bedside Lactic Acid Venous


  0.84


  0.90-1.70


mmol/L


 


Calcium Level 9.2 8.5-10.1  mg/dl


 


Total Bilirubin 0.8 0.2-1  mg/dl


 


Aspartate Amino Transf


(AST/SGOT) 18


  15-37  U/L


 


 


Alanine Aminotransferase


(ALT/SGPT) 37


  12-78  U/L


 


 


Alkaline Phosphatase 109   U/L


 


Total Protein 7.6 6.4-8.2  gm/dl


 


Albumin 3.1 3.4-5.0  gm/dl


 


Globulin 4.5 2.5-4.0  gm/dl


 


Albumin/Globulin Ratio 0.7 0.9-2  








 Microbiology Results


2/1/17 Blood Culture, Janie Batch


         Pending


2/1/17 Blood Culture, Janie Batch


         Pending





Diagnostic Radiology


ct scan showing abdominal abcess





Impression


Assessment and Plan


1 week s/p appendicitis with abcess


will admit for IV antibiotics


pt nontoxic.  IR unavailable to drain.  will plan laparoscopy with abdominal 

washout and drain placement in AM.





Level of Care


Med/Surg





VTE Prophylaxis


VTE Risk Assessment Done? Y/N:  Yes


Risk Level:  Moderate


Given or contraindicated:  Enoxaparin (Lovenox)ADRIA, T.E.D. Stockings

## 2017-02-01 NOTE — DIAGNOSTIC IMAGING REPORT
CT SCAN OF THE ABDOMEN AND PELVIS WITH IV CONTRAST



CLINICAL HISTORY:   Generalized abdominal pain. Fever.



COMPARISON STUDY:  Abdominal CT dated 1/24/2017.



TECHNIQUE: Following the IV administration of  93 cc of Optiray 320, CT scan of

the abdomen and pelvis is performed from the lung bases to the proximal femora.

Images are reviewed in the axial, sagittal, and coronal planes. IV contrast was

administered without complication. Automated dose control exposure was utilized.



CT DOSE: 1005.16 mGycm



FINDINGS:



Lung bases: The heart is enlarged and without pericardial effusion. A calcified

granuloma is noted at the right lung base. The lung bases are otherwise clear

noting dependent atelectasis.



Liver: The contrast-enhanced liver is normal in size, contour, and attenuation.

There is mild to moderate intrahepatic biliary ductal dilatation. The hepatic

veins and portal veins are patent.



Gallbladder: Surgically absent noting clips in the gallbladder fossa.



Spleen: Normal in size and attenuation.



Pancreas: There is mild glandular atrophy. Scattered calcifications are noted in

the pancreatic tail.



Adrenal glands: Unremarkable.



Kidneys: The contrast enhanced kidneys are normal in size and without

hydronephrosis. The kidneys enhance symmetrically. 2 right renal cysts are again

noted measuring up to 3.3 cm.



Abdominal vasculature: The abdominal aorta is normal in course and caliber

noting mild atherosclerotic calcification.



Stomach and bowel: There is a moderate hiatal hernia. Postoperative changes are

consistent with a history of Yanci-en-Y gastric bypass surgery. No bowel

obstruction is seen. There is moderate colonic fecal retention. There is

moderate to advanced sigmoid diverticulosis without CT evidence of acute

diverticulitis. Moderate colonic fecal retention is observed. The appendix is

surgically absent. Inflammatory stranding in the right lower quadrant is

nonspecific, and some of this is likely related to recent appendectomy. There is

a thick-walled and peripherally enhancing complex/multiloculated fluid

collection identified in the right lower quadrant around the cecum. A loculation

seen on axial image #265 measures approximately 3.3 x 3.3 x 4.3 cm. A loculation

seen posterior to the cecum on image #264 measures 8.6 x 3.7 x 2.5 cm.

Additional complex fluid is identified in the deep pelvis as seen on image #355.



Peritoneum: There is no intraperitoneal free air or abdominal ascites. Foci of

subcutaneous gas are present within the right lower quadrant pannus, likely

related to recent surgery.



Lymphadenopathy: None.



Pelvic viscera: The bladder, prostate, and seminal vesicles are normal as

visualized. There is a fat-containing left inguinal hernia. A right-sided

varicocele is suspected.



Skeletal structures: There is mild lumbosacral spondylosis. No lytic or blastic

lesions are seen.





IMPRESSION:



1. There are postoperative changes from interval appendectomy as compared to

1/24/2017.



2. There are complex/multiloculated fluid collections identified around the

cecum in the right lower quadrant as detailed above. Although this could

represent hematoma/seroma, these collections are concerning for abscesses given

the clinical history of postoperative fever.



3. Additional complex fluid is identified in the pelvis.



4. There are postoperative changes consistent with Yanci-en-Y gastric bypass

surgery. No bowel obstruction is seen.



5. Moderate to advanced sigmoid diverticulosis without CT evidence of acute

diverticulitis.



6. Cardiomegaly.



7. Additional changes as above.







Electronically signed by:  Benny Guzman M.D.

2/1/2017 3:30 PM



Dictated Date/Time:  2/1/2017 3:18 PM

## 2017-02-01 NOTE — ANESTHESIOLOGY PROGRESS NOTE
Anesthesia Progress Note


Date of Service


Feb 1, 2017.





Progress Notes


Mr. Hutchinson is scheduled for a lab abdominal washout with drain placement by 

Dr. Romo on 2/2/17. He had an appendectomy on 1/17/17 without incident and 

now presents with intraabdominal infection. PSH significant for appy, gastric 

bypass and laparoscopy for perforated ulcer. NKDA. PMH significant for GERD, 

HTN. Patient is a never smoker and can walk stairs without SOB or CP. EKG 

showed sinus tach with ST and T wave abnormalities but no changes when compared 

to ECG taken 24Jan17. Airway exam notable for short mandible with MP 3. Per 

last anesthesia record for appy, patient a grade 3 view with a MAC 3 blade. 

Attending anesthesiologist wrote he suggests either a MAC 4 or glidescope with 

next intubation. Patient told to be NPO after midnight. Consent obtained for 

GETA. All questions answered.

## 2017-02-01 NOTE — EMERGENCY ROOM VISIT NOTE
History


First contact with patient:  13:53


Chief Complaint:  ABDOMINAL PAIN


Stated Complaint:  PAIN LRQ


Nursing Triage Summary:  


Pt states he was referred by Dr Romo,





WBC elevated,





Had appendix surgery last Tuesday night.





History of Present Illness


Patient is a 56-year-old white male who is one-week status post a lap 

appendectomy who presents to the emergency department from his surgeon's office 

for evaluation.  Patient underwent uncomplicated laparoscopic appendectomy 

early in the morning on January 25 and was discharged later that day.  He 

states that he has been feeling poorly since the surgery.  He has had ongoing 

right lower quadrant pain which is not alleviated with hydrocodone or 

acetaminophen.  He is having difficulty sleeping secondary to pain.  He is not 

nauseous, but is anorexic.  He has had intermittent low-grade fevers around 100

F and chills.  He was constipated due to the narcotics and has been using 

MiraLAX which produced 2 bowel movements.  He has not had any diarrhea.  He has 

been increasing in his fluid intake, but reports decreased urine output and 

notes that his urine is dark.  He had his one-week follow-up appointment with 

his surgeon today, and had blood work performed which showed a white count of 15

,900.  Dr. Romo attempted to get an outpatient CT scan but was unable to 

get authorized by insurance and thus sent the patient to the emergency 

department for evaluation.





Review of Systems


Review of systems as per HPI.  All other systems reviewed were negative.  10 

systems reviewed.





Past Medical/Surgical History


Medical Problems:


(1) Abdominal abscess


(2) Abdominal pain


(3) Abdominal pain


(4) Abdominal pain


(5) Acute appendicitis


(6) Appendicitis


(7) Aspiration pneumonia


(8) Dehydration


(9) HTN (hypertension)


(10) Perforated ulcer


(11) Pneumonia


(12) Reflux Esophagitis


(13) Sepsis


(14) Symptomatic anemia


Surgical Problems:


(1) H/O gastric bypass


(2) History of laparoscopic appendectomy


(3) S/P laparoscopic surgery


Electronic medical records are reviewed and summarized as above/below.  See 

Problem List.





Family History





Heart disease


Hypertension





Social History


Smoking Status:  Never Smoker


Alcohol Use:  occasionally


Marital Status:  


Occupation Status:  employed





Current/Historical Medications


Scheduled


Atenolol (Atenolol), 12.5 MG PO DAILY


Cholecalciferol (Vitamin D), 4,000 INTER.UNIT PO WK


Ferrous Gluconate (Ferrous Gluconate), 324 MG PO DAILY


Multivitamin (Multivitamin), 1 TAB PO DAILY


Pantoprazole (Pantoprazole Sodium), 40 MG PO DAILY





Allergies


Coded Allergies:  


     No Known Allergies (Unverified , 1/11/15)





Physical Exam


Vital Signs











  Date Time  Temp Pulse Resp B/P Pulse Ox O2 Delivery O2 Flow Rate FiO2


 


2/1/17 15:21  85 16 144/81 94 Room Air  


 


2/1/17 13:45 36.7 89 17 129/85 95 Room Air  











Physical Exam


CONSTITUTIONAL:  Patient is an uncomfortable-appearing 56-year-old white male 

who is awake and alert and in no acute distress.  Vital signs are stable.  He 

is afebrile.  


EYES:  Pupils equal, round, reactive to light and accommodation.  EOMs intact 

without nystagmus.  Sclera are anicteric. 


ENT:  Tympanic membranes intact, with normal landmarks.  External canals are 

clear.  Oral and nasopharynx are clear.  Mucous membranes are moist, no lesions

, tongue and gums appear normal.     


NECK: Supple without lymphadenopathy.  No thyromegaly.  No meningeal signs.  

Full active range of motion without discomfort.


CARDIOVASCULAR: Regular rate and rhythm, with normal S1 and S2, no murmur or 

gallop or rub is heard.  No carotid bruits auscultated.  No JVD.  Peripheral 

pulses easy to palpable.


RESPIRATORY: Breath sounds equal and clear to auscultation without wheezes, 

rales, or rhonchi heard.   Full and equal chest expansion without accessory 

muscle use or retractions. 


GI: Lower abdominal laparoscopic surgical incisions are well healing and 

intact.  Slight ecchymosis is noted around the umbilicus.  Bowel sounds are 

present.  Abdomen is soft, nondistended and tender to percussion and palpation 

in the right lower quadrant, with guarding.  No organomegaly.  No pulsatile 

masses.  


MUSCULOSKELETAL: Full range of motion of extremities x 4 with good strength.  

No cyanosis, edema, joint tenderness or swelling.  No deformity.


INTEGUMENTARY: No lesions or rash, normal skin turgor. 


NEUROLOGICAL: Alert, oriented, and cooperative.  Cranial nerves, sensation and 

strength grossly intact.  Pupils round, equal, and react to light, EOMs are 

full.


LYMPH: No lymphadenopathy.





Medical Decision & Procedures


ER Provider


Diagnostic Interpretation:


CT SCAN OF THE ABDOMEN AND PELVIS WITH IV CONTRAST





CLINICAL HISTORY:   Generalized abdominal pain. Fever.





COMPARISON STUDY:  Abdominal CT dated 1/24/2017.





TECHNIQUE: Following the IV administration of  93 cc of Optiray 320, CT scan of


the abdomen and pelvis is performed from the lung bases to the proximal femora.


Images are reviewed in the axial, sagittal, and coronal planes. IV contrast was


administered without complication. Automated dose control exposure was utilized.





CT DOSE: 1005.16 mGycm





FINDINGS:





Lung bases: The heart is enlarged and without pericardial effusion. A calcified


granuloma is noted at the right lung base. The lung bases are otherwise clear


noting dependent atelectasis.





Liver: The contrast-enhanced liver is normal in size, contour, and attenuation.


There is mild to moderate intrahepatic biliary ductal dilatation. The hepatic


veins and portal veins are patent.





Gallbladder: Surgically absent noting clips in the gallbladder fossa.





Spleen: Normal in size and attenuation.





Pancreas: There is mild glandular atrophy. Scattered calcifications are noted in


the pancreatic tail.





Adrenal glands: Unremarkable.





Kidneys: The contrast enhanced kidneys are normal in size and without


hydronephrosis. The kidneys enhance symmetrically. 2 right renal cysts are again


noted measuring up to 3.3 cm.





Abdominal vasculature: The abdominal aorta is normal in course and caliber


noting mild atherosclerotic calcification.





Stomach and bowel: There is a moderate hiatal hernia. Postoperative changes are


consistent with a history of Yanci-en-Y gastric bypass surgery. No bowel


obstruction is seen. There is moderate colonic fecal retention. There is


moderate to advanced sigmoid diverticulosis without CT evidence of acute


diverticulitis. Moderate colonic fecal retention is observed. The appendix is


surgically absent. Inflammatory stranding in the right lower quadrant is


nonspecific, and some of this is likely related to recent appendectomy. There is


a thick-walled and peripherally enhancing complex/multiloculated fluid


collection identified in the right lower quadrant around the cecum. A loculation


seen on axial image #265 measures approximately 3.3 x 3.3 x 4.3 cm. A loculation


seen posterior to the cecum on image #264 measures 8.6 x 3.7 x 2.5 cm.


Additional complex fluid is identified in the deep pelvis as seen on image #355.





Peritoneum: There is no intraperitoneal free air or abdominal ascites. Foci of


subcutaneous gas are present within the right lower quadrant pannus, likely


related to recent surgery.





Lymphadenopathy: None.





Pelvic viscera: The bladder, prostate, and seminal vesicles are normal as


visualized. There is a fat-containing left inguinal hernia. A right-sided


varicocele is suspected.





Skeletal structures: There is mild lumbosacral spondylosis. No lytic or blastic


lesions are seen.








IMPRESSION:





1. There are postoperative changes from interval appendectomy as compared to


1/24/2017.





2. There are complex/multiloculated fluid collections identified around the


cecum in the right lower quadrant as detailed above. Although this could


represent hematoma/seroma, these collections are concerning for abscesses given


the clinical history of postoperative fever.





3. Additional complex fluid is identified in the pelvis.





4. There are postoperative changes consistent with Yanci-en-Y gastric bypass


surgery. No bowel obstruction is seen.





5. Moderate to advanced sigmoid diverticulosis without CT evidence of acute


diverticulitis.





6. Cardiomegaly.





7. Additional changes as above.





Laboratory Results


2/1/17 14:24








2/1/17 14:24

















Test


  2/1/17


14:20 2/1/17


14:24


 


Urine Color DK YELLOW  


 


Urine Appearance CLEAR (CLEAR)  


 


Urine pH 5.5 (4.5-7.5)  


 


Urine Specific Gravity


  > 1.045


(1.000-1.030) 


 


 


Urine Protein 2+ (NEG)  


 


Urine Glucose (UA) NEG (NEG)  


 


Urine Ketones 3+ (NEG)  


 


Urine Occult Blood NEG (NEG)  


 


Urine Nitrite NEG (NEG)  


 


Urine Bilirubin NEG (NEG)  


 


Urine Urobilinogen POS (NEG)  


 


Urine Leukocyte Esterase NEG (NEG)  


 


Urine WBC (Auto) 1-5 /hpf (0-5)  


 


Urine RBC (Auto) 0-4 /hpf (0-4)  


 


Urine Hyaline Casts (Auto)


  5-10 /lpf


(0-5) 


 


 


Urine Epithelial Cells (Auto) >30 /lpf (0-5)  


 


Urine Bacteria (Auto) NEG (NEG)  


 


Red Blood Count


  


  4.90 M/uL


(4.7-6.1)


 


Mean Corpuscular Volume


  


  83.9 fL


()


 


Mean Corpuscular Hemoglobin


  


  27.3 pg


(25-34)


 


Mean Corpuscular Hemoglobin


Concent 


  32.6 g/dl


(32-36)


 


RDW Standard Deviation


  


  51.7 fL


(36.4-46.3)


 


RDW Coefficient of Variation


  


  16.7 %


(11.5-14.5)


 


Mean Platelet Volume


  


  10.8 fL


(7.4-10.4)


 


Prothrombin Time


  


  12.3 SECONDS


(9.0-12.0)


 


Prothromb Time International


Ratio 


  1.1 (0.9-1.1) 


 


 


Activated Partial


Thromboplast Time 


  33.0 SECONDS


(21.0-31.0)


 


Partial Thromboplastin Ratio  1.3 


 


Anion Gap


  


  7.0 mmol/L


(3-11)


 


Est Creatinine Clear Calc


Drug Dose 


  123.9 ml/min 


 


 


Estimated GFR (


American) 


  119.2 


 


 


Estimated GFR (Non-


American 


  102.8 


 


 


BUN/Creatinine Ratio  32.4 (10-20) 


 


Bedside Lactic Acid Venous


  


  0.84 mmol/L


(0.90-1.70)


 


Calcium Level


  


  9.2 mg/dl


(8.5-10.1)


 


Total Bilirubin


  


  0.8 mg/dl


(0.2-1)


 


Aspartate Amino Transf


(AST/SGOT) 


  18 U/L (15-37) 


 


 


Alanine Aminotransferase


(ALT/SGPT) 


  37 U/L (12-78) 


 


 


Alkaline Phosphatase


  


  109 U/L


()


 


Total Protein


  


  7.6 gm/dl


(6.4-8.2)


 


Albumin


  


  3.1 gm/dl


(3.4-5.0)


 


Globulin


  


  4.5 gm/dl


(2.5-4.0)


 


Albumin/Globulin Ratio  0.7 (0.9-2) 











Medications Administered











 Medications


  (Trade)  Dose


 Ordered  Sig/Nisa


 Route  Start Time


 Stop Time Status Last Admin


Dose Admin


 


 Sodium Chloride  500 ml @ 


 999 mls/hr  Q31M STAT


 IV  2/1/17 14:01


 2/1/17 14:31 DC 2/1/17 14:40


999 MLS/HR


 


 Sodium Chloride


  (Nss 1000ml)  1,000 ml @ 


 200 mls/hr  Q5H STAT


 IV  2/1/17 14:01


 2/1/17 19:00  2/1/17 15:16


200 MLS/HR


 


 Morphine Sulfate


  (MoRPHine


 SULFATE INJ)  4 mg  NOW  STAT


 IV  2/1/17 14:46


 2/1/17 14:48 DC 2/1/17 15:18


4 MG


 


 Ondansetron HCl


  (Zofran Inj)  4 mg  NOW  STAT


 IV  2/1/17 14:46


 2/1/17 14:48 DC 2/1/17 15:18


4 MG


 


 Piperacillin Sod/


 Tazobactam Sod


  (Zosyn Iv)  4.5 gm  NOW  STAT


 IV  2/1/17 15:48


 2/1/17 15:49 DC 2/1/17 16:40


4.5 GM











ED Course


The patient was seen and examined as above.  Old records were reviewed.  IV 

access was obtained and additional laboratory studies were collected including 

CMP, PT/PTT, point-of-care lactic acid, urinalysis and blood cultures 2.  He 

was hydrated with normal saline solution and medicated with morphine and Zofran 

IV.  CT scan of the abdomen and pelvis with IV contrast was ordered.





As noted above, the patient's white count was 15,900 when his labs were 

performed at 10:30 this morning.  Electrolytes: Sodium 143, potassium 3.8, 

chloride 110, carbon dioxide 26, BUN 23 and creatinine 0.72.  Point-of-care 

lactic acid is normal at 0.84.  Liver functions are not elevated.  Coags are 

within normal limits.





CT scan demonstrated complex, multiloculated fluid collections in the right 

lower quadrant, likely representing an abscess.  After CT scan findings were 

noted, the patient was given Zosyn 4.5 g IV.





The results of the patient's CT scan were discussed with Dr. Romo.  He will 

admit the patient to the hospital for IV antibiotics and surgical intervention.

  Please refer to his H&P for further information.





Medical Decision


Patient is a 56-year-old white male one week status post laparoscopic 

appendectomy who has had persistent right lower quadrant pain with associated 

fevers.  Differential diagnosis includes perforation, abscess, bowel obstruction

, hematoma, seroma, hernia, among others.





Impression





 Primary Impression:  


 Abscess, intra-abdominal, postoperative





Departure Information


Dispostion


Admitted as an inpatient





Referrals


Eric Andrade PA-C (PCP)





Patient Instructions


[facility]





Problem Qualifiers








 Primary Impression:  


 Abscess, intra-abdominal, postoperative


 Encounter type:  initial encounter  Qualified Codes:  T81.4XXA - Infection 

following a procedure, initial encounter; K65.1 - Peritoneal abscess

## 2017-02-02 VITALS
SYSTOLIC BLOOD PRESSURE: 126 MMHG | TEMPERATURE: 99.5 F | OXYGEN SATURATION: 95 % | DIASTOLIC BLOOD PRESSURE: 79 MMHG | HEART RATE: 102 BPM

## 2017-02-02 VITALS
DIASTOLIC BLOOD PRESSURE: 80 MMHG | SYSTOLIC BLOOD PRESSURE: 135 MMHG | HEART RATE: 88 BPM | OXYGEN SATURATION: 93 % | TEMPERATURE: 98.42 F

## 2017-02-02 VITALS
TEMPERATURE: 98.6 F | OXYGEN SATURATION: 94 % | SYSTOLIC BLOOD PRESSURE: 126 MMHG | HEART RATE: 90 BPM | DIASTOLIC BLOOD PRESSURE: 76 MMHG

## 2017-02-02 VITALS
SYSTOLIC BLOOD PRESSURE: 130 MMHG | OXYGEN SATURATION: 94 % | TEMPERATURE: 98.42 F | DIASTOLIC BLOOD PRESSURE: 78 MMHG | HEART RATE: 94 BPM

## 2017-02-02 VITALS
OXYGEN SATURATION: 92 % | TEMPERATURE: 97.88 F | DIASTOLIC BLOOD PRESSURE: 77 MMHG | HEART RATE: 96 BPM | SYSTOLIC BLOOD PRESSURE: 136 MMHG

## 2017-02-02 VITALS
TEMPERATURE: 99.32 F | HEART RATE: 76 BPM | DIASTOLIC BLOOD PRESSURE: 79 MMHG | OXYGEN SATURATION: 93 % | SYSTOLIC BLOOD PRESSURE: 156 MMHG

## 2017-02-02 VITALS
OXYGEN SATURATION: 94 % | TEMPERATURE: 97.88 F | SYSTOLIC BLOOD PRESSURE: 130 MMHG | DIASTOLIC BLOOD PRESSURE: 78 MMHG | HEART RATE: 100 BPM

## 2017-02-02 LAB
BASOPHILS # BLD: 0.03 K/UL (ref 0–0.2)
BASOPHILS NFR BLD: 0.2 %
COMPLETE: YES
EOSINOPHIL NFR BLD AUTO: 295 K/UL (ref 130–400)
HCT VFR BLD CALC: 38.1 % (ref 42–52)
IG%: 1 %
IMM GRANULOCYTES NFR BLD AUTO: 8.3 %
LYMPHOCYTES # BLD: 1.04 K/UL (ref 1.2–3.4)
MCH RBC QN AUTO: 28 PG (ref 25–34)
MCHC RBC AUTO-ENTMCNC: 32.5 G/DL (ref 32–36)
MCV RBC AUTO: 86 FL (ref 80–100)
MONOCYTES NFR BLD: 7.2 %
NEUTROPHILS # BLD AUTO: 1.3 %
NEUTROPHILS NFR BLD AUTO: 82 %
PMV BLD AUTO: 10.4 FL (ref 7.4–10.4)
RBC # BLD AUTO: 4.43 M/UL (ref 4.7–6.1)
WBC # BLD AUTO: 12.58 K/UL (ref 4.8–10.8)

## 2017-02-02 PROCEDURE — 0W9J4ZX DRAINAGE OF PELVIC CAVITY, PERCUTANEOUS ENDOSCOPIC APPROACH, DIAGNOSTIC: ICD-10-PCS | Performed by: SURGERY

## 2017-02-02 RX ADMIN — MORPHINE SULFATE PRN MG: 2 INJECTION, SOLUTION INTRAMUSCULAR; INTRAVENOUS at 13:19

## 2017-02-02 RX ADMIN — MORPHINE SULFATE PRN MG: 2 INJECTION, SOLUTION INTRAMUSCULAR; INTRAVENOUS at 11:30

## 2017-02-02 RX ADMIN — FENTANYL CITRATE PRN MCG: 50 INJECTION, SOLUTION INTRAMUSCULAR; INTRAVENOUS at 16:15

## 2017-02-02 RX ADMIN — PIPERACILLIN AND TAZOBACTAM SCH MLS/HR: 3; .375 INJECTION, POWDER, LYOPHILIZED, FOR SOLUTION INTRAVENOUS; PARENTERAL at 05:25

## 2017-02-02 RX ADMIN — MORPHINE SULFATE PRN MG: 2 INJECTION, SOLUTION INTRAMUSCULAR; INTRAVENOUS at 02:42

## 2017-02-02 RX ADMIN — MORPHINE SULFATE PRN MG: 2 INJECTION, SOLUTION INTRAMUSCULAR; INTRAVENOUS at 05:33

## 2017-02-02 RX ADMIN — HYDROMORPHONE HYDROCHLORIDE PRN MG: 1 INJECTION, SOLUTION INTRAMUSCULAR; INTRAVENOUS; SUBCUTANEOUS at 23:31

## 2017-02-02 RX ADMIN — DEXTROSE MONOHYDRATE, SODIUM CHLORIDE, AND POTASSIUM CHLORIDE SCH MLS/HR: 50; 4.5; 1.49 INJECTION, SOLUTION INTRAVENOUS at 09:31

## 2017-02-02 RX ADMIN — MORPHINE SULFATE PRN MG: 2 INJECTION, SOLUTION INTRAMUSCULAR; INTRAVENOUS at 01:40

## 2017-02-02 RX ADMIN — DEXTROSE MONOHYDRATE, SODIUM CHLORIDE, AND POTASSIUM CHLORIDE SCH MLS/HR: 50; 4.5; 1.49 INJECTION, SOLUTION INTRAVENOUS at 17:04

## 2017-02-02 RX ADMIN — FENTANYL CITRATE PRN MCG: 50 INJECTION, SOLUTION INTRAMUSCULAR; INTRAVENOUS at 16:07

## 2017-02-02 RX ADMIN — ACETAMINOPHEN SCH MLS/HR: 10 INJECTION, SOLUTION INTRAVENOUS at 17:04

## 2017-02-02 RX ADMIN — MORPHINE SULFATE PRN MG: 2 INJECTION, SOLUTION INTRAMUSCULAR; INTRAVENOUS at 09:36

## 2017-02-02 RX ADMIN — DEXTROSE MONOHYDRATE, SODIUM CHLORIDE, AND POTASSIUM CHLORIDE SCH MLS/HR: 50; 4.5; 1.49 INJECTION, SOLUTION INTRAVENOUS at 01:40

## 2017-02-02 RX ADMIN — ENOXAPARIN SODIUM SCH MG: 40 INJECTION SUBCUTANEOUS at 09:31

## 2017-02-02 RX ADMIN — PIPERACILLIN AND TAZOBACTAM SCH MLS/HR: 3; .375 INJECTION, POWDER, LYOPHILIZED, FOR SOLUTION INTRAVENOUS; PARENTERAL at 22:00

## 2017-02-02 RX ADMIN — HYDROMORPHONE HYDROCHLORIDE PRN MG: 1 INJECTION, SOLUTION INTRAMUSCULAR; INTRAVENOUS; SUBCUTANEOUS at 19:07

## 2017-02-02 RX ADMIN — PIPERACILLIN AND TAZOBACTAM SCH MLS/HR: 3; .375 INJECTION, POWDER, LYOPHILIZED, FOR SOLUTION INTRAVENOUS; PARENTERAL at 13:18

## 2017-02-02 RX ADMIN — HYDROMORPHONE HYDROCHLORIDE PRN MG: 1 INJECTION, SOLUTION INTRAMUSCULAR; INTRAVENOUS; SUBCUTANEOUS at 21:38

## 2017-02-02 RX ADMIN — MORPHINE SULFATE PRN MG: 2 INJECTION, SOLUTION INTRAMUSCULAR; INTRAVENOUS at 07:31

## 2017-02-02 NOTE — ANESTHESIOLOGY PROGRESS NOTE
Anesthesia Post Op Note


Date & Time


Feb 2, 2017 at 16:08





Vital Signs


Pain Intensity:  7.0





 Vital Signs Past 12 Hours








  Date Time  Temp Pulse Resp B/P Pulse Ox O2 Delivery O2 Flow Rate FiO2


 


2/2/17 08:36      Room Air  


 


2/2/17 07:30 37.4 76 17 156/79 93 Room Air  











Notes


Mental Status:  alert / awake / arousable, participated in evaluation


Pt Amnestic to Procedure:  Yes


Nausea / Vomiting:  adequately controlled


Pain:  adequately controlled


Airway Patency, RR, SpO2:  stable & adequate


BP & HR:  stable & adequate


Hydration State:  stable & adequate


Anesthetic Complications:  no major complications apparent

## 2017-02-02 NOTE — SURGERY PROGRESS NOTE
Surgery Progress Note


Date of Service


Feb 2, 2017.





Subjective


continues to have moderate to severe RLQ pain...no other complaints. afebrile.





Objective


Vital Signs:











  Date Time  Temp Pulse Resp B/P Pulse Ox O2 Delivery O2 Flow Rate FiO2


 


2/1/17 23:52 36.9 95 16 131/77 93 Room Air  


 


2/1/17 23:25      Room Air  


 


2/1/17 18:00 37.5 100 16 141/85 92 Room Air  


 


2/1/17 17:41  97 17 147/82 96   


 


2/1/17 16:49  97 17 147/82 96 Room Air  


 


2/1/17 15:21  85 16 144/81 94 Room Air  


 


2/1/17 13:45 36.7 89 17 129/85 95 Room Air  








General Appearance:  + mild distress


Head:  normocephalic, atraumatic


Neck:  supple


Respiratory/Chest:  no respiratory distress, no accessory muscle use


Cardiovascular:  no edema


Abdomen:  + pertinent finding (RLQ ttp with guading. no rebound)


Incision(s):  clean, dry, intact


Extremities:  normal range of motion, non-tender


Laboratory Results:





Results Past 24 Hours








Test


  2/1/17


14:20 2/1/17


14:24 2/2/17


05:40 Range/Units


 


 


Urine Color DK YELLOW    


 


Urine Appearance CLEAR   CLEAR  


 


Urine pH 5.5   4.5-7.5  


 


Urine Specific Gravity > 1.045   1.000-1.030  


 


Urine Protein 2+   NEG  


 


Urine Glucose (UA) NEG   NEG  


 


Urine Ketones 3+   NEG  


 


Urine Occult Blood NEG   NEG  


 


Urine Nitrite NEG   NEG  


 


Urine Bilirubin NEG   NEG  


 


Urine Urobilinogen POS   NEG  


 


Urine Leukocyte Esterase NEG   NEG  


 


Urine WBC (Auto) 1-5   0-5  /hpf


 


Urine RBC (Auto) 0-4   0-4  /hpf


 


Urine Hyaline Casts (Auto) 5-10   0-5  /lpf


 


Urine Epithelial Cells (Auto) >30   0-5  /lpf


 


Urine Bacteria (Auto) NEG   NEG  


 


White Blood Count  15.83 12.58 4.8-10.8  K/uL


 


Red Blood Count  4.90 4.43 4.7-6.1  M/uL


 


Hemoglobin  13.4 12.4 14.0-18.0  g/dL


 


Hematocrit  41.1 38.1 42-52  %


 


Mean Corpuscular Volume  83.9 86.0   fL


 


Mean Corpuscular Hemoglobin  27.3 28.0 25-34  pg


 


Mean Corpuscular Hemoglobin


Concent 


  32.6


  32.5


  32-36  g/dl


 


 


RDW Standard Deviation  51.7 53.5 36.4-46.3  fL


 


RDW Coefficient of Variation  16.7 16.9 11.5-14.5  %


 


Platelet Count  318 295 130-400  K/uL


 


Mean Platelet Volume  10.8 10.4 7.4-10.4  fL


 


Prothrombin Time


  


  12.3


  


  9.0-12.0


SECONDS


 


Prothromb Time International


Ratio 


  1.1


  


  0.9-1.1  


 


 


Activated Partial


Thromboplast Time 


  33.0


  


  21.0-31.0


SECONDS


 


Partial Thromboplastin Ratio  1.3   


 


Sodium Level  143  136-145  mmol/L


 


Potassium Level  3.8  3.5-5.1  mmol/L


 


Chloride Level  110    mmol/L


 


Carbon Dioxide Level  26  21-32  mmol/L


 


Anion Gap  7.0  3-11  mmol/L


 


Blood Urea Nitrogen  23  7-18  mg/dl


 


Creatinine


  


  0.72


  


  0.60-1.40


mg/dl


 


Est Creatinine Clear Calc


Drug Dose 


  123.9


  


   ml/min


 


 


Estimated GFR (


American) 


  119.2


  


   


 


 


Estimated GFR (Non-


American 


  102.8


  


   


 


 


BUN/Creatinine Ratio  32.4  10-20  


 


Random Glucose  141  70-99  mg/dl


 


Bedside Lactic Acid Venous


  


  0.84


  


  0.90-1.70


mmol/L


 


Calcium Level  9.2  8.5-10.1  mg/dl


 


Total Bilirubin  0.8  0.2-1  mg/dl


 


Aspartate Amino Transf


(AST/SGOT) 


  18


  


  15-37  U/L


 


 


Alanine Aminotransferase


(ALT/SGPT) 


  37


  


  12-78  U/L


 


 


Alkaline Phosphatase  109    U/L


 


Total Protein  7.6  6.4-8.2  gm/dl


 


Albumin  3.1  3.4-5.0  gm/dl


 


Globulin  4.5  2.5-4.0  gm/dl


 


Albumin/Globulin Ratio  0.7  0.9-2  


 


Neutrophils (%) (Auto)   82.0  %


 


Lymphocytes (%) (Auto)   8.3  %


 


Monocytes (%) (Auto)   7.2  %


 


Eosinophils (%) (Auto)   1.3  %


 


Basophils (%) (Auto)   0.2  %


 


Neutrophils # (Auto)   10.32 1.4-6.5  K/uL


 


Lymphocytes # (Auto)   1.04 1.2-3.4  K/uL


 


Monocytes # (Auto)   0.90 0.11-0.59  K/uL


 


Eosinophils # (Auto)   0.16 0-0.5  K/uL


 


Basophils # (Auto)   0.03 0-0.2  K/uL


 


Immature Granulocyte % (Auto)   1.0  %


 


Immature Granulocyte # (Auto)   0.13 0.00-0.02  K/uL








 Microbiology Results


2/1/17 Blood Culture, Received


         Pending


2/1/17 Blood Culture, Received


         Pending





Assessment & Plan


abdominal abcess s/p lap appy


To OR today for washout/drain placement


nonseptic


IV antibiotics


symptom control 


discussed risks /alternatives. pt agreeable. questions answered.

## 2017-02-02 NOTE — [facility] OPERATIVE REPORT
Operative Report


Operative Date


Feb 2, 2017.





Pre-Operative Diagnosis





ABDOMINAL ABSCESS





Post-Operative Diagnosis


infected abdominal hematoma





Procedure(s) Performed


laparoscopy with abdominal washout/placement of drain





Surgeon


DR HURST





Assistant Surgeon(s)


YOBANI GUTIERREZ PA-C





Estimated Blood Loss


10





Findings


moderate sized hematoma in RLQ as well as some old blood in pelvis.   acute 

inflammatory phlegmon in RLQ





Specimens





CULTURE SENT FOR MICROBIOLOGY--C + S





Anesthesia


GET





Complication(s)


None





Disposition


Recovery Room / PACU


I attest to the content of the Intraoperative Record and any orders documented 

therein.  Any exceptions are noted below.

## 2017-02-02 NOTE — OPERATIVE REPORT
DATE OF OPERATION:  02/02/2017

 

PREOPERATIVE DIAGNOSIS:  Suspected abdominal abscess, status post

laparoscopic appendectomy.

 

POSTOPERATIVE DIAGNOSIS:  Suspected infected hematoma status post

appendectomy.

 

PROCEDURES PERFORMED:  Laparoscopy with evacuation of abdominal hematoma,

abdominal washout and placement of abdominal drain.

 

SURGEON:  Dr. Romo.

 

ASSISTANT:  Anuj Reyes PA-C.

 

ESTIMATED BLOOD LOSS:  Minimal.

 

COMPLICATIONS:  No immediate complications.

 

ANESTHESIA:  General.

 

The patient tolerated the procedure well.

 

DESCRIPTION OF PROCEDURE:  After informed consent was obtained, the patient

was taken to the operating suite and placed in supine position.  After

successful intubation, the abdomen was sterilely prepped and draped in usual

fashion.  We used his old infraumbilical incision, reopened with an 11 blade

scalpel.  We were able to grab the old Vicryl stitch and cut it and opened

the fascia.  Two another 0 Vicryl stay sutures were placed on either side and

a finger sweep was performed.  A 12 mm Alyssa trocar was placed and the

abdomen was insufflated to 18 mmHg.  Laparoscope was inserted and the abdomen

examined 360 degrees.  We could see an inflammatory process in the right

lower quadrant initially.  I was able to place a suprapubic 5 mm trocar and a

left lower quadrant 5 mm trocar through the old incision sites.  The patient

was then placed in Trendelenburg position.  I began by looking in the pelvis

where I saw some old blood pooled.  It was liquified, it was a little bit

murky.  I was able to suction it out and irrigate the pelvis thoroughly.  Our

attention was then turned to the right lower quadrant where the primary

inflammatory process was.  I was able to peel away the terminal ileum,

omentum as well as the right colon away from the right lower quadrant

sidewall.  This immediately released a gush of old blood.  There was a

hematoma kind of between the cecum and the right lower quadrant sidewall.  I

was able to suction this out.  We did grab a piece of the hematoma and send

it for Gram stain, culture and sensitivity.  We continued to slowly peel the

right colon away from the sidewall, releasing small amounts of old blood.  We

suctioned out the debris.  Everything was acutely inflamed.  He had basically

a phlegmon from the omentum.  I saw no actual pus and I saw no bile or

succus.  There was no evidence of a staple line leak or bowel injury.  There

was no subhepatic abscess.  The left and right upper quadrants were pristine

and all of the inflammatory process appeared to be in the right lower

quadrant around the cecum.  We continued to suction irrigate old debris and

old blood out and thoroughly irrigated the right lower quadrant as well as

the pelvis.  Once we had the majority of debris out, I saw no other

abnormalities and we placed a #10 flat Vikas-Pelayo drain from the right

lower quadrant down into the pelvis.  It was brought out through the left

lower quadrant incision and secured using 2-0 nylon.  The other trocars were

removed and the abdomen was desufflated.  The fascia of the camera port was

closed using 0 Vicryl in a figure-of-eight fashion.  The wounds were

irrigated and closed using 4-0 Monocryl.  Skin glue was used as a dressing. 

The patient was awakened, extubated, and transferred to recovery in stable

condition.

 

 

I attest to the content of the Intraoperative Record and any orders documented therein. Any exceptio
ns are noted below.

## 2017-02-03 VITALS
SYSTOLIC BLOOD PRESSURE: 147 MMHG | TEMPERATURE: 98.96 F | OXYGEN SATURATION: 91 % | HEART RATE: 100 BPM | DIASTOLIC BLOOD PRESSURE: 78 MMHG

## 2017-02-03 VITALS
OXYGEN SATURATION: 91 % | TEMPERATURE: 98.6 F | SYSTOLIC BLOOD PRESSURE: 117 MMHG | DIASTOLIC BLOOD PRESSURE: 75 MMHG | HEART RATE: 90 BPM

## 2017-02-03 VITALS
DIASTOLIC BLOOD PRESSURE: 79 MMHG | TEMPERATURE: 98.78 F | OXYGEN SATURATION: 92 % | SYSTOLIC BLOOD PRESSURE: 127 MMHG | HEART RATE: 89 BPM

## 2017-02-03 VITALS
DIASTOLIC BLOOD PRESSURE: 82 MMHG | HEART RATE: 79 BPM | OXYGEN SATURATION: 92 % | TEMPERATURE: 98.78 F | SYSTOLIC BLOOD PRESSURE: 138 MMHG

## 2017-02-03 VITALS — TEMPERATURE: 98.42 F

## 2017-02-03 LAB
ALBUMIN/GLOB SERPL: 0.6 {RATIO} (ref 0.9–2)
ALP SERPL-CCNC: 214 U/L (ref 45–117)
ALT SERPL-CCNC: 53 U/L (ref 12–78)
ANION GAP SERPL CALC-SCNC: 10 MMOL/L (ref 3–11)
AST SERPL-CCNC: 32 U/L (ref 15–37)
BASOPHILS # BLD: 0.02 K/UL (ref 0–0.2)
BASOPHILS NFR BLD: 0.2 %
BUN SERPL-MCNC: 5 MG/DL (ref 7–18)
BUN/CREAT SERPL: 6.7 (ref 10–20)
CALCIUM SERPL-MCNC: 8.9 MG/DL (ref 8.5–10.1)
CHLORIDE SERPL-SCNC: 106 MMOL/L (ref 98–107)
CO2 SERPL-SCNC: 25 MMOL/L (ref 21–32)
COMPLETE: YES
CREAT CL PREDICTED SERPL C-G-VRATE: 112.9 ML/MIN
CREAT SERPL-MCNC: 0.79 MG/DL (ref 0.6–1.4)
EOSINOPHIL NFR BLD AUTO: 326 K/UL (ref 130–400)
GLOBULIN SER-MCNC: 4.1 GM/DL (ref 2.5–4)
GLUCOSE SERPL-MCNC: 137 MG/DL (ref 70–99)
HCT VFR BLD CALC: 37.3 % (ref 42–52)
IG%: 1 %
IMM GRANULOCYTES NFR BLD AUTO: 8.3 %
LYMPHOCYTES # BLD: 1.02 K/UL (ref 1.2–3.4)
MCH RBC QN AUTO: 27.7 PG (ref 25–34)
MCHC RBC AUTO-ENTMCNC: 32.4 G/DL (ref 32–36)
MCV RBC AUTO: 85.4 FL (ref 80–100)
MONOCYTES NFR BLD: 6.8 %
NEUTROPHILS # BLD AUTO: 0.2 %
NEUTROPHILS NFR BLD AUTO: 83.5 %
PMV BLD AUTO: 10.8 FL (ref 7.4–10.4)
POTASSIUM SERPL-SCNC: 4 MMOL/L (ref 3.5–5.1)
RBC # BLD AUTO: 4.37 M/UL (ref 4.7–6.1)
SODIUM SERPL-SCNC: 141 MMOL/L (ref 136–145)
WBC # BLD AUTO: 12.33 K/UL (ref 4.8–10.8)

## 2017-02-03 RX ADMIN — ACETAMINOPHEN SCH MLS/HR: 10 INJECTION, SOLUTION INTRAVENOUS at 16:38

## 2017-02-03 RX ADMIN — HYDROMORPHONE HYDROCHLORIDE PRN MG: 1 INJECTION, SOLUTION INTRAMUSCULAR; INTRAVENOUS; SUBCUTANEOUS at 10:36

## 2017-02-03 RX ADMIN — ACETAMINOPHEN SCH MLS/HR: 10 INJECTION, SOLUTION INTRAVENOUS at 08:51

## 2017-02-03 RX ADMIN — DEXTROSE MONOHYDRATE, SODIUM CHLORIDE, AND POTASSIUM CHLORIDE SCH MLS/HR: 50; 4.5; 1.49 INJECTION, SOLUTION INTRAVENOUS at 00:52

## 2017-02-03 RX ADMIN — HYDROMORPHONE HYDROCHLORIDE PRN MG: 1 INJECTION, SOLUTION INTRAMUSCULAR; INTRAVENOUS; SUBCUTANEOUS at 20:16

## 2017-02-03 RX ADMIN — PANTOPRAZOLE SCH MG: 40 TABLET, DELAYED RELEASE ORAL at 08:52

## 2017-02-03 RX ADMIN — FERROUS GLUCONATE SCH MG: 324 TABLET ORAL at 08:51

## 2017-02-03 RX ADMIN — HYDROMORPHONE HYDROCHLORIDE PRN MG: 2 INJECTION, SOLUTION INTRAMUSCULAR; INTRAVENOUS; SUBCUTANEOUS at 23:07

## 2017-02-03 RX ADMIN — DEXTROSE MONOHYDRATE, SODIUM CHLORIDE, AND POTASSIUM CHLORIDE SCH MLS/HR: 50; 4.5; 1.49 INJECTION, SOLUTION INTRAVENOUS at 16:41

## 2017-02-03 RX ADMIN — PIPERACILLIN AND TAZOBACTAM SCH MLS/HR: 3; .375 INJECTION, POWDER, LYOPHILIZED, FOR SOLUTION INTRAVENOUS; PARENTERAL at 05:53

## 2017-02-03 RX ADMIN — ACETAMINOPHEN SCH MLS/HR: 10 INJECTION, SOLUTION INTRAVENOUS at 00:50

## 2017-02-03 RX ADMIN — HYDROMORPHONE HYDROCHLORIDE PRN MG: 2 INJECTION, SOLUTION INTRAMUSCULAR; INTRAVENOUS; SUBCUTANEOUS at 15:29

## 2017-02-03 RX ADMIN — HYDROMORPHONE HYDROCHLORIDE PRN MG: 1 INJECTION, SOLUTION INTRAMUSCULAR; INTRAVENOUS; SUBCUTANEOUS at 07:37

## 2017-02-03 RX ADMIN — PIPERACILLIN AND TAZOBACTAM SCH MLS/HR: 3; .375 INJECTION, POWDER, LYOPHILIZED, FOR SOLUTION INTRAVENOUS; PARENTERAL at 21:41

## 2017-02-03 RX ADMIN — Medication SCH TAB: at 08:52

## 2017-02-03 RX ADMIN — ENOXAPARIN SODIUM SCH MG: 40 INJECTION SUBCUTANEOUS at 08:53

## 2017-02-03 RX ADMIN — HYDROMORPHONE HYDROCHLORIDE PRN MG: 1 INJECTION, SOLUTION INTRAMUSCULAR; INTRAVENOUS; SUBCUTANEOUS at 13:35

## 2017-02-03 RX ADMIN — PIPERACILLIN AND TAZOBACTAM SCH MLS/HR: 3; .375 INJECTION, POWDER, LYOPHILIZED, FOR SOLUTION INTRAVENOUS; PARENTERAL at 13:33

## 2017-02-03 RX ADMIN — DEXTROSE MONOHYDRATE, SODIUM CHLORIDE, AND POTASSIUM CHLORIDE SCH MLS/HR: 50; 4.5; 1.49 INJECTION, SOLUTION INTRAVENOUS at 08:53

## 2017-02-03 RX ADMIN — HYDROMORPHONE HYDROCHLORIDE PRN MG: 1 INJECTION, SOLUTION INTRAMUSCULAR; INTRAVENOUS; SUBCUTANEOUS at 05:31

## 2017-02-03 NOTE — ANESTHESIOLOGY PROGRESS NOTE
Anesthesia Post Op Note


Date & Time


Feb 3, 2017 at 08:22





Vital Signs


Pain Intensity:  7.0





 Vital Signs Past 12 Hours








  Date Time  Temp Pulse Resp B/P Pulse Ox O2 Delivery O2 Flow Rate FiO2


 


2/3/17 07:49 37.2 100 18 147/78 91 Room Air  


 


2/3/17 03:29 37.0 90 15 117/75 91 Room Air  


 


2/3/17 01:15 36.9       


 


2/2/17 23:30      Room Air  


 


2/2/17 23:23 37.5 102 16 126/79 95 Room Air  


 


2/2/17 20:23 36.6 100 16 130/78 94 Nasal Cannula 2.0 











Notes


Mental Status:  alert / awake / arousable, participated in evaluation


Pt Amnestic to Procedure:  Yes


Nausea / Vomiting:  adequately controlled


Pain:  adequately controlled


Airway Patency, RR, SpO2:  stable & adequate


BP & HR:  stable & adequate


Hydration State:  stable & adequate


Anesthetic Complications:  no major complications apparent

## 2017-02-03 NOTE — SURGERY PROGRESS NOTE
Surgery Progress Note


Date of Service


Feb 3, 2017.





Subjective


Post OP Day:  1


+ ambulating (in room), + diet (clears), + feeling well (RLQ pain improved), No 

nausea





Objective


Vital Signs:











  Date Time  Temp Pulse Resp B/P Pulse Ox O2 Delivery O2 Flow Rate FiO2


 


2/3/17 03:29 37.0 90 15 117/75 91 Room Air  


 


2/3/17 01:15 36.9       


 


2/2/17 23:30      Room Air  


 


2/2/17 23:23 37.5 102 16 126/79 95 Room Air  


 


2/2/17 20:23 36.6 100 16 130/78 94 Nasal Cannula 2.0 


 


2/2/17 18:53 36.9 94 16 130/78 94 Nasal Cannula 4.0 


 


2/2/17 17:49 37.0 90 16 126/76 94 Nasal Cannula 4.0 


 


2/2/17 17:26 36.9 88 16 135/80 93 Nasal Cannula 4.0 


 


2/2/17 16:50     92 Nasal Cannula 4.0 


 


2/2/17 16:50 36.6 96 16 136/77 92 Nasal Cannula 4.0 


 


2/2/17 16:50     92 Nasal Cannula 4.0 


 


2/2/17 16:36 37.2       


 


2/2/17 16:33    142/74    


 


2/2/17 16:30  93 12  94   


 


2/2/17 16:30  87 12     


 


2/2/17 16:28    143/80    


 


2/2/17 16:25  91 21     


 


2/2/17 16:25  90 21  96   


 


2/2/17 16:23    151/76    


 


2/2/17 16:20  94 14  96   


 


2/2/17 16:20  92 14     


 


2/2/17 16:18    163/84    


 


2/2/17 16:15  96 16  98   


 


2/2/17 16:15  96 16     


 


2/2/17 16:13    170/80    


 


2/2/17 16:10  94 19     


 


2/2/17 16:10  94 19  98   


 


2/2/17 16:08    165/83    


 


2/2/17 16:05  93 21     


 


2/2/17 16:05  94 21  97   


 


2/2/17 16:03    167/85    


 


2/2/17 16:00  94 23     


 


2/2/17 16:00  94 23  96   


 


2/2/17 15:58    166/79    


 


2/2/17 15:57    174/83    


 


2/2/17 15:55 37 92 18 174/83 93 Mask 10 


 


2/2/17 08:36      Room Air  


 


2/2/17 07:30 37.4 76 17 156/79 93 Room Air  








Physical Exam:  ALYSON drainage (50 cc overnight)


Abdomen:  non distended, soft


Incision(s):  clean, dry


Laboratory Results:





Results Past 24 Hours








Test


  2/3/17


05:55 Range/Units


 


 


Sodium Level 141 136-145  mmol/L


 


Potassium Level 4.0 3.5-5.1  mmol/L


 


Chloride Level 106   mmol/L


 


Carbon Dioxide Level 25 21-32  mmol/L


 


Anion Gap 10.0 3-11  mmol/L


 


Blood Urea Nitrogen 5 7-18  mg/dl


 


Creatinine


  0.79


  0.60-1.40


mg/dl


 


Est Creatinine Clear Calc


Drug Dose 112.9


   ml/min


 


 


Estimated GFR (


American) 114.7


   


 


 


Estimated GFR (Non-


American 99.0


   


 


 


BUN/Creatinine Ratio 6.7 10-20  


 


Random Glucose 137 70-99  mg/dl


 


Calcium Level 8.9 8.5-10.1  mg/dl


 


Total Bilirubin 0.6 0.2-1  mg/dl


 


Aspartate Amino Transf


(AST/SGOT) 32


  15-37  U/L


 


 


Alanine Aminotransferase


(ALT/SGPT) 53


  12-78  U/L


 


 


Alkaline Phosphatase 214   U/L


 


Total Protein 6.4 6.4-8.2  gm/dl


 


Albumin 2.3 3.4-5.0  gm/dl


 


Globulin 4.1 2.5-4.0  gm/dl


 


Albumin/Globulin Ratio 0.6 0.9-2  








 Microbiology Results


2/2/17 Gram Stain - Preliminary, Resulted


         


2/2/17 Bacterial Culture, Resulted


         Pending





Assessment & Plan


s/p laparoscopic washout RLQ phlegmon/developing pelvic abscess


   HR, WBC improved


   continue IV abx


   advance diet as mariel

## 2017-02-04 VITALS
HEART RATE: 86 BPM | TEMPERATURE: 98.78 F | SYSTOLIC BLOOD PRESSURE: 149 MMHG | DIASTOLIC BLOOD PRESSURE: 90 MMHG | OXYGEN SATURATION: 94 %

## 2017-02-04 VITALS
OXYGEN SATURATION: 93 % | TEMPERATURE: 98.42 F | SYSTOLIC BLOOD PRESSURE: 149 MMHG | HEART RATE: 74 BPM | DIASTOLIC BLOOD PRESSURE: 90 MMHG

## 2017-02-04 VITALS
DIASTOLIC BLOOD PRESSURE: 80 MMHG | HEART RATE: 89 BPM | TEMPERATURE: 98.6 F | OXYGEN SATURATION: 92 % | SYSTOLIC BLOOD PRESSURE: 145 MMHG

## 2017-02-04 LAB
BASOPHILS # BLD: 0.04 K/UL (ref 0–0.2)
BASOPHILS NFR BLD: 0.4 %
COMPLETE: YES
EOSINOPHIL NFR BLD AUTO: 349 K/UL (ref 130–400)
HCT VFR BLD CALC: 36.6 % (ref 42–52)
IG%: 2 %
IMM GRANULOCYTES NFR BLD AUTO: 10.4 %
LYMPHOCYTES # BLD: 0.99 K/UL (ref 1.2–3.4)
MCH RBC QN AUTO: 27.9 PG (ref 25–34)
MCHC RBC AUTO-ENTMCNC: 33.1 G/DL (ref 32–36)
MCV RBC AUTO: 84.3 FL (ref 80–100)
MONOCYTES NFR BLD: 10.2 %
NEUTROPHILS # BLD AUTO: 4.3 %
NEUTROPHILS NFR BLD AUTO: 72.7 %
PMV BLD AUTO: 10.1 FL (ref 7.4–10.4)
RBC # BLD AUTO: 4.34 M/UL (ref 4.7–6.1)
WBC # BLD AUTO: 9.48 K/UL (ref 4.8–10.8)

## 2017-02-04 RX ADMIN — ACETAMINOPHEN SCH MLS/HR: 10 INJECTION, SOLUTION INTRAVENOUS at 16:38

## 2017-02-04 RX ADMIN — DEXTROSE MONOHYDRATE, SODIUM CHLORIDE, AND POTASSIUM CHLORIDE SCH MLS/HR: 50; 4.5; 1.49 INJECTION, SOLUTION INTRAVENOUS at 17:54

## 2017-02-04 RX ADMIN — DEXTROSE MONOHYDRATE, SODIUM CHLORIDE, AND POTASSIUM CHLORIDE SCH MLS/HR: 50; 4.5; 1.49 INJECTION, SOLUTION INTRAVENOUS at 01:05

## 2017-02-04 RX ADMIN — ACETAMINOPHEN SCH MLS/HR: 10 INJECTION, SOLUTION INTRAVENOUS at 08:59

## 2017-02-04 RX ADMIN — PIPERACILLIN AND TAZOBACTAM SCH MLS/HR: 3; .375 INJECTION, POWDER, LYOPHILIZED, FOR SOLUTION INTRAVENOUS; PARENTERAL at 13:24

## 2017-02-04 RX ADMIN — HYDROMORPHONE HYDROCHLORIDE PRN MG: 2 INJECTION, SOLUTION INTRAMUSCULAR; INTRAVENOUS; SUBCUTANEOUS at 12:32

## 2017-02-04 RX ADMIN — PANTOPRAZOLE SCH MG: 40 TABLET, DELAYED RELEASE ORAL at 09:00

## 2017-02-04 RX ADMIN — HYDROMORPHONE HYDROCHLORIDE PRN MG: 2 INJECTION, SOLUTION INTRAMUSCULAR; INTRAVENOUS; SUBCUTANEOUS at 16:40

## 2017-02-04 RX ADMIN — PIPERACILLIN AND TAZOBACTAM SCH MLS/HR: 3; .375 INJECTION, POWDER, LYOPHILIZED, FOR SOLUTION INTRAVENOUS; PARENTERAL at 05:25

## 2017-02-04 RX ADMIN — DEXTROSE MONOHYDRATE, SODIUM CHLORIDE, AND POTASSIUM CHLORIDE SCH MLS/HR: 50; 4.5; 1.49 INJECTION, SOLUTION INTRAVENOUS at 08:59

## 2017-02-04 RX ADMIN — FERROUS GLUCONATE SCH MG: 324 TABLET ORAL at 09:00

## 2017-02-04 RX ADMIN — Medication SCH TAB: at 09:00

## 2017-02-04 RX ADMIN — HYDROMORPHONE HYDROCHLORIDE PRN MG: 2 INJECTION, SOLUTION INTRAMUSCULAR; INTRAVENOUS; SUBCUTANEOUS at 18:02

## 2017-02-04 RX ADMIN — HYDROMORPHONE HYDROCHLORIDE PRN MG: 2 INJECTION, SOLUTION INTRAMUSCULAR; INTRAVENOUS; SUBCUTANEOUS at 05:25

## 2017-02-04 RX ADMIN — HYDROMORPHONE HYDROCHLORIDE PRN MG: 2 INJECTION, SOLUTION INTRAMUSCULAR; INTRAVENOUS; SUBCUTANEOUS at 08:59

## 2017-02-04 RX ADMIN — PIPERACILLIN AND TAZOBACTAM SCH MLS/HR: 3; .375 INJECTION, POWDER, LYOPHILIZED, FOR SOLUTION INTRAVENOUS; PARENTERAL at 21:40

## 2017-02-04 RX ADMIN — HYDROMORPHONE HYDROCHLORIDE PRN MG: 1 INJECTION, SOLUTION INTRAMUSCULAR; INTRAVENOUS; SUBCUTANEOUS at 01:08

## 2017-02-04 RX ADMIN — ENOXAPARIN SODIUM SCH MG: 40 INJECTION SUBCUTANEOUS at 08:59

## 2017-02-04 RX ADMIN — ACETAMINOPHEN SCH MLS/HR: 10 INJECTION, SOLUTION INTRAVENOUS at 00:31

## 2017-02-04 NOTE — SURGERY PROGRESS NOTE
Surgery Progress Note


Date of Service


Feb 4, 2017.





Subjective


Post OP Day:  2


symptoms improving


mariel diet


pain improving daily





Objective


Vital Signs:











  Date Time  Temp Pulse Resp B/P Pulse Ox O2 Delivery O2 Flow Rate FiO2


 


2/4/17 08:01      Room Air  


 


2/4/17 07:29 37.0 89 16 145/80 92 Room Air  


 


2/3/17 23:34 37.1 89 18 127/79 92 Room Air  


 


2/3/17 23:30      Room Air  


 


2/3/17 15:30      Room Air  


 


2/3/17 15:17 37.1 79 18 138/82 92 Room Air  








Physical Exam:  ALYSON drainage (bloody as expected. no change)


General Appearance:  no apparent distress


Head:  normocephalic


Neck:  supple


Abdomen:  soft, + pertinent finding (expected tenderness)


Incision(s):  clean, dry, intact, no erythema


Extremities:  normal range of motion


Laboratory Results:





Results Past 24 Hours








Test


  2/4/17


09:43 Range/Units


 


 


White Blood Count 9.48 4.8-10.8  K/uL


 


Red Blood Count 4.34 4.7-6.1  M/uL


 


Hemoglobin 12.1 14.0-18.0  g/dL


 


Hematocrit 36.6 42-52  %


 


Mean Corpuscular Volume 84.3   fL


 


Mean Corpuscular Hemoglobin 27.9 25-34  pg


 


Mean Corpuscular Hemoglobin


Concent 33.1


  32-36  g/dl


 


 


Platelet Count 349 130-400  K/uL


 


Mean Platelet Volume 10.1 7.4-10.4  fL


 


Neutrophils (%) (Auto) 72.7  %


 


Lymphocytes (%) (Auto) 10.4  %


 


Monocytes (%) (Auto) 10.2  %


 


Eosinophils (%) (Auto) 4.3  %


 


Basophils (%) (Auto) 0.4  %


 


Neutrophils # (Auto) 6.88 1.4-6.5  K/uL


 


Lymphocytes # (Auto) 0.99 1.2-3.4  K/uL


 


Monocytes # (Auto) 0.97 0.11-0.59  K/uL


 


Eosinophils # (Auto) 0.41 0-0.5  K/uL


 


Basophils # (Auto) 0.04 0-0.2  K/uL


 


RDW Standard Deviation 50.2 36.4-46.3  fL


 


RDW Coefficient of Variation 16.4 11.5-14.5  %


 


Immature Granulocyte % (Auto) 2.0  %


 


Immature Granulocyte # (Auto) 0.19 0.00-0.02  K/uL











Assessment & Plan


2/4/17


clinically improving


wbc today pending


mariel diet


afebrile


not ready for d/c yet..likely monday














2/3/17


abdominal abcess s/p lap appy


To OR today for washout/drain placement


nonseptic


IV antibiotics


symptom control 


discussed risks /alternatives. pt agreeable. questions answered.


abdominal abcess s/p lap appy


To OR today for washout/drain placement


nonseptic


IV antibiotics


symptom control 


discussed risks /alternatives. pt agreeable. questions answered.

## 2017-02-05 VITALS
OXYGEN SATURATION: 94 % | DIASTOLIC BLOOD PRESSURE: 88 MMHG | TEMPERATURE: 98.06 F | SYSTOLIC BLOOD PRESSURE: 134 MMHG | HEART RATE: 72 BPM

## 2017-02-05 VITALS
OXYGEN SATURATION: 93 % | DIASTOLIC BLOOD PRESSURE: 79 MMHG | SYSTOLIC BLOOD PRESSURE: 135 MMHG | TEMPERATURE: 98.24 F | HEART RATE: 72 BPM

## 2017-02-05 VITALS
TEMPERATURE: 98.42 F | OXYGEN SATURATION: 92 % | SYSTOLIC BLOOD PRESSURE: 134 MMHG | DIASTOLIC BLOOD PRESSURE: 75 MMHG | HEART RATE: 91 BPM

## 2017-02-05 VITALS — OXYGEN SATURATION: 93 %

## 2017-02-05 LAB
BASOPHILS # BLD: 0.04 K/UL (ref 0–0.2)
BASOPHILS NFR BLD: 0.5 %
COMPLETE: YES
CREAT CL PREDICTED SERPL C-G-VRATE: 115.8 ML/MIN
CREAT SERPL-MCNC: 0.77 MG/DL (ref 0.6–1.4)
EOSINOPHIL NFR BLD AUTO: 393 K/UL (ref 130–400)
HCT VFR BLD CALC: 36.3 % (ref 42–52)
IG%: 2.9 %
IMM GRANULOCYTES NFR BLD AUTO: 12.3 %
LYMPHOCYTES # BLD: 1.07 K/UL (ref 1.2–3.4)
MCH RBC QN AUTO: 27.9 PG (ref 25–34)
MCHC RBC AUTO-ENTMCNC: 32.5 G/DL (ref 32–36)
MCV RBC AUTO: 85.8 FL (ref 80–100)
MONOCYTES NFR BLD: 9.6 %
NEUTROPHILS # BLD AUTO: 4.4 %
NEUTROPHILS NFR BLD AUTO: 70.3 %
PMV BLD AUTO: 9.8 FL (ref 7.4–10.4)
RBC # BLD AUTO: 4.23 M/UL (ref 4.7–6.1)
WBC # BLD AUTO: 8.72 K/UL (ref 4.8–10.8)

## 2017-02-05 RX ADMIN — Medication SCH TAB: at 09:45

## 2017-02-05 RX ADMIN — ENOXAPARIN SODIUM SCH MG: 40 INJECTION SUBCUTANEOUS at 09:44

## 2017-02-05 RX ADMIN — PIPERACILLIN AND TAZOBACTAM SCH MLS/HR: 3; .375 INJECTION, POWDER, LYOPHILIZED, FOR SOLUTION INTRAVENOUS; PARENTERAL at 13:30

## 2017-02-05 RX ADMIN — OXYCODONE HYDROCHLORIDE AND ACETAMINOPHEN PRN TAB: 5; 325 TABLET ORAL at 19:10

## 2017-02-05 RX ADMIN — ACETAMINOPHEN SCH MLS/HR: 10 INJECTION, SOLUTION INTRAVENOUS at 09:00

## 2017-02-05 RX ADMIN — PANTOPRAZOLE SCH MG: 40 TABLET, DELAYED RELEASE ORAL at 09:42

## 2017-02-05 RX ADMIN — PIPERACILLIN AND TAZOBACTAM SCH MLS/HR: 3; .375 INJECTION, POWDER, LYOPHILIZED, FOR SOLUTION INTRAVENOUS; PARENTERAL at 06:11

## 2017-02-05 RX ADMIN — HYDROMORPHONE HYDROCHLORIDE PRN MG: 2 INJECTION, SOLUTION INTRAMUSCULAR; INTRAVENOUS; SUBCUTANEOUS at 00:03

## 2017-02-05 RX ADMIN — OXYCODONE HYDROCHLORIDE AND ACETAMINOPHEN PRN TAB: 5; 325 TABLET ORAL at 09:41

## 2017-02-05 RX ADMIN — ACETAMINOPHEN SCH MLS/HR: 10 INJECTION, SOLUTION INTRAVENOUS at 17:40

## 2017-02-05 RX ADMIN — ACETAMINOPHEN SCH MLS/HR: 10 INJECTION, SOLUTION INTRAVENOUS at 01:44

## 2017-02-05 RX ADMIN — HYDROMORPHONE HYDROCHLORIDE PRN MG: 2 INJECTION, SOLUTION INTRAMUSCULAR; INTRAVENOUS; SUBCUTANEOUS at 15:51

## 2017-02-05 RX ADMIN — DEXTROSE MONOHYDRATE, SODIUM CHLORIDE, AND POTASSIUM CHLORIDE SCH MLS/HR: 50; 4.5; 1.49 INJECTION, SOLUTION INTRAVENOUS at 01:45

## 2017-02-05 RX ADMIN — FERROUS GLUCONATE SCH MG: 324 TABLET ORAL at 09:43

## 2017-02-05 RX ADMIN — HYDROMORPHONE HYDROCHLORIDE PRN MG: 2 INJECTION, SOLUTION INTRAMUSCULAR; INTRAVENOUS; SUBCUTANEOUS at 06:19

## 2017-02-05 RX ADMIN — PIPERACILLIN AND TAZOBACTAM SCH MLS/HR: 3; .375 INJECTION, POWDER, LYOPHILIZED, FOR SOLUTION INTRAVENOUS; PARENTERAL at 21:36

## 2017-02-05 RX ADMIN — HYDROMORPHONE HYDROCHLORIDE PRN MG: 2 INJECTION, SOLUTION INTRAMUSCULAR; INTRAVENOUS; SUBCUTANEOUS at 21:38

## 2017-02-05 RX ADMIN — OXYCODONE HYDROCHLORIDE AND ACETAMINOPHEN PRN TAB: 5; 325 TABLET ORAL at 13:33

## 2017-02-05 NOTE — SURGERY PROGRESS NOTE
DATE: 02/05/2017

 

Randy is resting comfortably this morning.  He still has some abdominal

discomfort, especially on the right side.  He said even though the drain

comes in from midline, it seems to be going on the right side, which is what

I expected where it was placed.  His abdomen is a little bit distended.  He

is passing flatus.  He has not moved his bowels yet.  The drainage is serous,

slightly sanguineous, it is not purulent looking.  His last vitals showed a

temperature of 36.9, pulse 91, respiratory rate 16, blood pressure 134/75, O2

sat is 92 on room air.

 

LABORATORY STUDIES:  His white count is 8.72, does not have any left shift,

hemoglobin is 11.8, BUN is 5, creatinine 0.79.  The micro as expected showed

an E. coli and he is appropriately covered.  

 

At this point, will transfer and give him something p.o. for analgesic. 

Continue broad spectrum antibiotics, and at this point reevaluate him

tomorrow and possibly discharge.

## 2017-02-06 VITALS
SYSTOLIC BLOOD PRESSURE: 132 MMHG | DIASTOLIC BLOOD PRESSURE: 84 MMHG | HEART RATE: 84 BPM | OXYGEN SATURATION: 93 % | TEMPERATURE: 97.88 F

## 2017-02-06 VITALS
TEMPERATURE: 97.88 F | OXYGEN SATURATION: 93 % | SYSTOLIC BLOOD PRESSURE: 132 MMHG | HEART RATE: 84 BPM | DIASTOLIC BLOOD PRESSURE: 84 MMHG

## 2017-02-06 VITALS — OXYGEN SATURATION: 93 %

## 2017-02-06 RX ADMIN — PIPERACILLIN AND TAZOBACTAM SCH MLS/HR: 3; .375 INJECTION, POWDER, LYOPHILIZED, FOR SOLUTION INTRAVENOUS; PARENTERAL at 06:28

## 2017-02-06 RX ADMIN — ACETAMINOPHEN SCH MLS/HR: 10 INJECTION, SOLUTION INTRAVENOUS at 08:54

## 2017-02-06 RX ADMIN — OXYCODONE HYDROCHLORIDE AND ACETAMINOPHEN PRN TAB: 5; 325 TABLET ORAL at 09:42

## 2017-02-06 RX ADMIN — ACETAMINOPHEN SCH MLS/HR: 10 INJECTION, SOLUTION INTRAVENOUS at 00:54

## 2017-02-06 RX ADMIN — PANTOPRAZOLE SCH MG: 40 TABLET, DELAYED RELEASE ORAL at 08:54

## 2017-02-06 RX ADMIN — HYDROMORPHONE HYDROCHLORIDE PRN MG: 1 INJECTION, SOLUTION INTRAMUSCULAR; INTRAVENOUS; SUBCUTANEOUS at 06:29

## 2017-02-06 RX ADMIN — ENOXAPARIN SODIUM SCH MG: 40 INJECTION SUBCUTANEOUS at 08:56

## 2017-02-06 RX ADMIN — FERROUS GLUCONATE SCH MG: 324 TABLET ORAL at 08:53

## 2017-02-06 RX ADMIN — Medication SCH TAB: at 08:53

## 2017-02-06 NOTE — DISCHARGE SUMMARY
DISCHARGE DIAGNOSIS:  

1.  Postoperative infected hematoma. 

2.  Status post appendicitis.

 

DISCHARGE SUMMARY:  This is a 56-year-old white male who was about a week

status post laparoscopy appendectomy where he was evaluated in my office.  He

was not progressing as I had hoped and I ordered a white blood cell count

which was markedly elevated at about 16,000.  We did a stat CT scan which did

reveal multiple fluid collections; the primary one being in the right lower

quadrant, could not rule out abscess.  He was subsequently admitted to the

hospital, started on IV fluids, IV broad-spectrum antibiotics and pain

medications.  The following day he was taken to the operating room and

underwent a laparoscopy for evaluation.  He had a primary hematoma in the

right lower quadrant which was evacuated surgically.  It was sent for culture

and would eventually grow out as expected E coli gram negative bacteria.  We

irrigated the entire abdomen during the procedure as well as left a drain. 

He was subsequently admitted to the hospital postoperatively for symptom

control as well as IV antibiotics while cultures were pending.  He slowly

continued each day that he was in the hospital.  He remained on IV

antibiotics.  His white count slowly came down and normalized.  His drain

went from bloody drainage down to normal serous fluid and was subsequently

removed prior to discharge.  On Monday, 02/06/2017, he was doing well,

afebrile, the white count was back to normal and his ALYSON normalized and he was

therefore deemed stable for discharge.  He was sent home on oral Augmentin

and was to followup with myself in the office in one week.

## 2017-02-06 NOTE — SURGERY PROGRESS NOTE
Surgery Progress Note


Date of Service


Feb 6, 2017.





Subjective


Post OP Day:  4


feeling better...pt uncomfortable with ALYSON drain and requesting removal .mariel 

diet.  overall feeling better each day.





Objective


Vital Signs:











  Date Time  Temp Pulse Resp B/P Pulse Ox O2 Delivery O2 Flow Rate FiO2


 


2/6/17 09:26     93 Room Air  


 


2/6/17 08:11 36.6 84 15 132/84 93 Room Air  


 


2/6/17 07:15      Room Air  


 


2/6/17 00:30      Room Air  


 


2/5/17 22:56 36.7 72 16 134/88 94 Room Air  


 


2/5/17 16:15     93 Room Air  


 


2/5/17 14:59 36.8 72 16 135/79 93 Room Air  








Physical Exam:  ALYSON drainage (serous now and minimal. )


General Appearance:  no apparent distress


Abdomen:  non distended, soft


Incision(s):  clean, dry, intact, no erythema


Extremities:  normal range of motion, non-tender





Assessment & Plan


2/6/17


continuing to do well


afebrile and WBC now normal...ALYSON now serous/non-cloudy.  OK to remove ALYSON


mariel diet


ok for d/c on oral augmentin


instructions given.


f/u in office scheduled for 1 week. 











2/4/17


clinically improving


wbc today pending


mariel diet


afebrile


not ready for d/c yet..likely monday














2/3/17


abdominal abcess s/p lap appy


To OR today for washout/drain placement


nonseptic


IV antibiotics


symptom control 


discussed risks /alternatives. pt agreeable. questions answered.


2/4/17


clinically improving


wbc today pending


mariel diet


afebrile


not ready for d/c yet..likely monday














2/3/17


abdominal abcess s/p lap appy


To OR today for washout/drain placement


nonseptic


IV antibiotics


symptom control 


discussed risks /alternatives. pt agreeable. questions answered.

## 2017-03-03 ENCOUNTER — HOSPITAL ENCOUNTER (OUTPATIENT)
Dept: HOSPITAL 45 - C.LABMFLN | Age: 57
Discharge: HOME | End: 2017-03-03
Attending: FAMILY MEDICINE
Payer: COMMERCIAL

## 2017-03-03 DIAGNOSIS — K21.0: ICD-10-CM

## 2017-03-03 DIAGNOSIS — I10: Primary | ICD-10-CM

## 2017-03-03 DIAGNOSIS — Z98.84: ICD-10-CM

## 2017-03-03 DIAGNOSIS — D64.9: ICD-10-CM

## 2017-03-03 LAB
ALBUMIN/GLOB SERPL: 1.1 {RATIO} (ref 0.9–2)
ALP SERPL-CCNC: 74 U/L (ref 45–117)
ALT SERPL-CCNC: 23 U/L (ref 12–78)
ANION GAP SERPL CALC-SCNC: 7 MMOL/L (ref 3–11)
AST SERPL-CCNC: 21 U/L (ref 15–37)
BASOPHILS # BLD: 0.05 K/UL (ref 0–0.2)
BASOPHILS NFR BLD: 0.9 %
BUN SERPL-MCNC: 10 MG/DL (ref 7–18)
BUN/CREAT SERPL: 13.7 (ref 10–20)
CALCIUM SERPL-MCNC: 9 MG/DL (ref 8.5–10.1)
CHLORIDE SERPL-SCNC: 108 MMOL/L (ref 98–107)
CO2 SERPL-SCNC: 29 MMOL/L (ref 21–32)
COMPLETE: YES
CREAT SERPL-MCNC: 0.7 MG/DL (ref 0.6–1.4)
EOSINOPHIL NFR BLD AUTO: 194 K/UL (ref 130–400)
GLOBULIN SER-MCNC: 3.6 GM/DL (ref 2.5–4)
GLUCOSE SERPL-MCNC: 66 MG/DL (ref 70–99)
HCT VFR BLD CALC: 42.9 % (ref 42–52)
IG%: 0.5 %
IMM GRANULOCYTES NFR BLD AUTO: 31.6 %
LYMPHOCYTES # BLD: 1.77 K/UL (ref 1.2–3.4)
MCH RBC QN AUTO: 27.8 PG (ref 25–34)
MCHC RBC AUTO-ENTMCNC: 31.7 G/DL (ref 32–36)
MCV RBC AUTO: 87.7 FL (ref 80–100)
MONOCYTES NFR BLD: 12.3 %
NEUTROPHILS # BLD AUTO: 2.7 %
NEUTROPHILS NFR BLD AUTO: 52 %
PMV BLD AUTO: 11.1 FL (ref 7.4–10.4)
POTASSIUM SERPL-SCNC: 4.1 MMOL/L (ref 3.5–5.1)
RBC # BLD AUTO: 4.89 M/UL (ref 4.7–6.1)
SODIUM SERPL-SCNC: 144 MMOL/L (ref 136–145)
TIBC SERPL-MCNC: 388 MCG/DL (ref 250–450)
TSH SERPL-ACNC: 0.41 UIU/ML (ref 0.3–4.5)
WBC # BLD AUTO: 5.6 K/UL (ref 4.8–10.8)

## 2017-09-08 ENCOUNTER — HOSPITAL ENCOUNTER (OUTPATIENT)
Dept: HOSPITAL 45 - C.LABMFLN | Age: 57
Discharge: HOME | End: 2017-09-08
Attending: FAMILY MEDICINE
Payer: COMMERCIAL

## 2017-09-08 DIAGNOSIS — I10: ICD-10-CM

## 2017-09-08 DIAGNOSIS — Z13.220: Primary | ICD-10-CM

## 2017-09-08 DIAGNOSIS — D50.9: ICD-10-CM

## 2017-09-08 LAB
BASOPHILS # BLD: 0.03 K/UL (ref 0–0.2)
BASOPHILS NFR BLD: 0.6 %
CHOLEST/HDLC SERPL: 2.1 {RATIO}
COMPLETE: YES
EOSINOPHIL NFR BLD AUTO: 164 K/UL (ref 130–400)
FERRITIN SERPL-MCNC: 24.2 NG/ML (ref 8–388)
GLUCOSE UR QL: 86 MG/DL
HCT VFR BLD CALC: 47.5 % (ref 42–52)
IG%: 0.6 %
IMM GRANULOCYTES NFR BLD AUTO: 30.9 %
KETONES UR QL STRIP: 82 MG/DL
LYMPHOCYTES # BLD: 1.55 K/UL (ref 1.2–3.4)
MCH RBC QN AUTO: 30.9 PG (ref 25–34)
MCHC RBC AUTO-ENTMCNC: 33.3 G/DL (ref 32–36)
MCV RBC AUTO: 93 FL (ref 80–100)
MONOCYTES NFR BLD: 12.4 %
NEUTROPHILS # BLD AUTO: 2.2 %
NEUTROPHILS NFR BLD AUTO: 53.3 %
NITRITE UR QL STRIP: 59 MG/DL (ref 0–150)
PH UR: 180 MG/DL (ref 0–200)
PMV BLD AUTO: 11.9 FL (ref 7.4–10.4)
RBC # BLD AUTO: 5.11 M/UL (ref 4.7–6.1)
VERY LOW DENSITY LIPOPROT CALC: 12 MG/DL
WBC # BLD AUTO: 5.02 K/UL (ref 4.8–10.8)

## 2018-03-15 ENCOUNTER — HOSPITAL ENCOUNTER (OUTPATIENT)
Dept: HOSPITAL 45 - C.LABMFLN | Age: 58
Discharge: HOME | End: 2018-03-15
Attending: PHYSICIAN ASSISTANT
Payer: COMMERCIAL

## 2018-03-15 DIAGNOSIS — D64.9: Primary | ICD-10-CM

## 2018-03-15 DIAGNOSIS — E55.9: ICD-10-CM

## 2018-03-15 LAB
BASOPHILS # BLD: 0.04 K/UL (ref 0–0.2)
BASOPHILS NFR BLD: 0.7 %
EOS ABS #: 0.07 K/UL (ref 0–0.5)
EOSINOPHIL NFR BLD AUTO: 160 K/UL (ref 130–400)
HCT VFR BLD CALC: 48.7 % (ref 42–52)
HGB BLD-MCNC: 16.1 G/DL (ref 14–18)
IG#: 0.04 K/UL (ref 0–0.02)
IMM GRANULOCYTES NFR BLD AUTO: 28.5 %
LYMPHOCYTES # BLD: 1.55 K/UL (ref 1.2–3.4)
MCH RBC QN AUTO: 31.6 PG (ref 25–34)
MCHC RBC AUTO-ENTMCNC: 33.1 G/DL (ref 32–36)
MCV RBC AUTO: 95.5 FL (ref 80–100)
MONO ABS #: 0.53 K/UL (ref 0.11–0.59)
MONOCYTES NFR BLD: 9.7 %
NEUT ABS #: 3.21 K/UL (ref 1.4–6.5)
NEUTROPHILS # BLD AUTO: 1.3 %
NEUTROPHILS NFR BLD AUTO: 59.1 %
PMV BLD AUTO: 12 FL (ref 7.4–10.4)
RED CELL DISTRIBUTION WIDTH CV: 13.3 % (ref 11.5–14.5)
RED CELL DISTRIBUTION WIDTH SD: 46.4 FL (ref 36.4–46.3)
WBC # BLD AUTO: 5.44 K/UL (ref 4.8–10.8)